# Patient Record
Sex: FEMALE | Race: WHITE | Employment: OTHER | URBAN - METROPOLITAN AREA
[De-identification: names, ages, dates, MRNs, and addresses within clinical notes are randomized per-mention and may not be internally consistent; named-entity substitution may affect disease eponyms.]

---

## 2020-08-21 ENCOUNTER — HOSPITAL ENCOUNTER (INPATIENT)
Age: 50
LOS: 4 days | Discharge: HOME | DRG: 872 | End: 2020-08-25
Attending: EMERGENCY MEDICINE | Admitting: FAMILY MEDICINE
Payer: MEDICARE

## 2020-08-21 ENCOUNTER — APPOINTMENT (OUTPATIENT)
Dept: ULTRASOUND IMAGING | Age: 50
DRG: 872 | End: 2020-08-21
Attending: INTERNAL MEDICINE
Payer: MEDICARE

## 2020-08-21 DIAGNOSIS — E86.0 DEHYDRATION: ICD-10-CM

## 2020-08-21 DIAGNOSIS — N17.9 AKI (ACUTE KIDNEY INJURY) (HCC): Primary | ICD-10-CM

## 2020-08-21 PROBLEM — N39.0 UTI (URINARY TRACT INFECTION): Status: ACTIVE | Noted: 2020-08-21

## 2020-08-21 LAB
ALBUMIN SERPL-MCNC: 3.1 G/DL (ref 3.4–5)
ALBUMIN/GLOB SERPL: 0.6 {RATIO} (ref 0.8–1.7)
ALP SERPL-CCNC: 123 U/L (ref 45–117)
ALT SERPL-CCNC: 10 U/L (ref 13–56)
ANION GAP SERPL CALC-SCNC: 7 MMOL/L (ref 3–18)
APPEARANCE UR: ABNORMAL
AST SERPL-CCNC: 8 U/L (ref 10–38)
BACTERIA URNS QL MICRO: ABNORMAL /HPF
BASOPHILS # BLD: 0 K/UL (ref 0–0.1)
BASOPHILS NFR BLD: 0 % (ref 0–2)
BILIRUB SERPL-MCNC: 0.3 MG/DL (ref 0.2–1)
BILIRUB UR QL: NEGATIVE
BUN SERPL-MCNC: 58 MG/DL (ref 7–18)
BUN/CREAT SERPL: 13 (ref 12–20)
CALCIUM SERPL-MCNC: 9.4 MG/DL (ref 8.5–10.1)
CHLORIDE SERPL-SCNC: 101 MMOL/L (ref 100–111)
CO2 SERPL-SCNC: 27 MMOL/L (ref 21–32)
COLOR UR: YELLOW
CREAT SERPL-MCNC: 4.62 MG/DL (ref 0.6–1.3)
CREAT UR-MCNC: 70 MG/DL (ref 30–125)
CREAT UR-MCNC: 71 MG/DL (ref 30–125)
DIFFERENTIAL METHOD BLD: ABNORMAL
EOSINOPHIL # BLD: 0.8 K/UL (ref 0–0.4)
EOSINOPHIL #/AREA URNS HPF: NORMAL /[HPF]
EOSINOPHIL NFR BLD: 6 % (ref 0–5)
EPITH CASTS URNS QL MICRO: ABNORMAL /LPF (ref 0–5)
ERYTHROCYTE [DISTWIDTH] IN BLOOD BY AUTOMATED COUNT: 16.8 % (ref 11.6–14.5)
GLOBULIN SER CALC-MCNC: 5 G/DL (ref 2–4)
GLUCOSE BLD STRIP.AUTO-MCNC: 98 MG/DL (ref 70–110)
GLUCOSE SERPL-MCNC: 108 MG/DL (ref 74–99)
GLUCOSE UR STRIP.AUTO-MCNC: NEGATIVE MG/DL
HCT VFR BLD AUTO: 34 % (ref 35–45)
HGB BLD-MCNC: 10.8 G/DL (ref 12–16)
HGB UR QL STRIP: ABNORMAL
KETONES UR QL STRIP.AUTO: NEGATIVE MG/DL
LACTATE SERPL-SCNC: 1 MMOL/L (ref 0.4–2)
LEUKOCYTE ESTERASE UR QL STRIP.AUTO: ABNORMAL
LYMPHOCYTES # BLD: 1.6 K/UL (ref 0.9–3.6)
LYMPHOCYTES NFR BLD: 12 % (ref 21–52)
MAGNESIUM SERPL-MCNC: 2.2 MG/DL (ref 1.6–2.6)
MCH RBC QN AUTO: 25.4 PG (ref 24–34)
MCHC RBC AUTO-ENTMCNC: 31.8 G/DL (ref 31–37)
MCV RBC AUTO: 79.8 FL (ref 74–97)
MONOCYTES # BLD: 0.7 K/UL (ref 0.05–1.2)
MONOCYTES NFR BLD: 6 % (ref 3–10)
NEUTS SEG # BLD: 10.3 K/UL (ref 1.8–8)
NEUTS SEG NFR BLD: 76 % (ref 40–73)
NITRITE UR QL STRIP.AUTO: NEGATIVE
PH UR STRIP: 6 [PH] (ref 5–8)
PLATELET # BLD AUTO: 402 K/UL (ref 135–420)
PMV BLD AUTO: 10.3 FL (ref 9.2–11.8)
POTASSIUM SERPL-SCNC: 3.9 MMOL/L (ref 3.5–5.5)
PROT SERPL-MCNC: 8.1 G/DL (ref 6.4–8.2)
PROT UR STRIP-MCNC: 30 MG/DL
PROT UR-MCNC: 63 MG/DL
PROT/CREAT UR-RTO: 0.9
RBC # BLD AUTO: 4.26 M/UL (ref 4.2–5.3)
RBC #/AREA URNS HPF: ABNORMAL /HPF (ref 0–5)
SODIUM SERPL-SCNC: 135 MMOL/L (ref 136–145)
SODIUM UR-SCNC: 42 MMOL/L (ref 20–110)
SP GR UR REFRACTOMETRY: 1.01 (ref 1–1.03)
UROBILINOGEN UR QL STRIP.AUTO: 0.2 EU/DL (ref 0.2–1)
WBC # BLD AUTO: 13.4 K/UL (ref 4.6–13.2)
WBC URNS QL MICRO: ABNORMAL /HPF (ref 0–5)

## 2020-08-21 PROCEDURE — 74011250636 HC RX REV CODE- 250/636: Performed by: EMERGENCY MEDICINE

## 2020-08-21 PROCEDURE — 74011250637 HC RX REV CODE- 250/637: Performed by: FAMILY MEDICINE

## 2020-08-21 PROCEDURE — 84156 ASSAY OF PROTEIN URINE: CPT

## 2020-08-21 PROCEDURE — 85025 COMPLETE CBC W/AUTO DIFF WBC: CPT

## 2020-08-21 PROCEDURE — 76770 US EXAM ABDO BACK WALL COMP: CPT

## 2020-08-21 PROCEDURE — 82784 ASSAY IGA/IGD/IGG/IGM EACH: CPT

## 2020-08-21 PROCEDURE — 81001 URINALYSIS AUTO W/SCOPE: CPT

## 2020-08-21 PROCEDURE — 99285 EMERGENCY DEPT VISIT HI MDM: CPT

## 2020-08-21 PROCEDURE — 84300 ASSAY OF URINE SODIUM: CPT

## 2020-08-21 PROCEDURE — 87147 CULTURE TYPE IMMUNOLOGIC: CPT

## 2020-08-21 PROCEDURE — 87086 URINE CULTURE/COLONY COUNT: CPT

## 2020-08-21 PROCEDURE — 65270000029 HC RM PRIVATE

## 2020-08-21 PROCEDURE — 80053 COMPREHEN METABOLIC PANEL: CPT

## 2020-08-21 PROCEDURE — 93005 ELECTROCARDIOGRAM TRACING: CPT

## 2020-08-21 PROCEDURE — 83883 ASSAY NEPHELOMETRY NOT SPEC: CPT

## 2020-08-21 PROCEDURE — 74011000250 HC RX REV CODE- 250: Performed by: EMERGENCY MEDICINE

## 2020-08-21 PROCEDURE — 82570 ASSAY OF URINE CREATININE: CPT

## 2020-08-21 PROCEDURE — 83605 ASSAY OF LACTIC ACID: CPT

## 2020-08-21 PROCEDURE — 86038 ANTINUCLEAR ANTIBODIES: CPT

## 2020-08-21 PROCEDURE — 87205 SMEAR GRAM STAIN: CPT

## 2020-08-21 PROCEDURE — 83735 ASSAY OF MAGNESIUM: CPT

## 2020-08-21 PROCEDURE — 82962 GLUCOSE BLOOD TEST: CPT

## 2020-08-21 PROCEDURE — 87040 BLOOD CULTURE FOR BACTERIA: CPT

## 2020-08-21 PROCEDURE — 96361 HYDRATE IV INFUSION ADD-ON: CPT

## 2020-08-21 PROCEDURE — 96374 THER/PROPH/DIAG INJ IV PUSH: CPT

## 2020-08-21 PROCEDURE — 96375 TX/PRO/DX INJ NEW DRUG ADDON: CPT

## 2020-08-21 RX ORDER — LEVOFLOXACIN 5 MG/ML
500 INJECTION, SOLUTION INTRAVENOUS
Status: DISCONTINUED | OUTPATIENT
Start: 2020-08-23 | End: 2020-08-24

## 2020-08-21 RX ORDER — PANTOPRAZOLE SODIUM 40 MG/1
40 TABLET, DELAYED RELEASE ORAL 2 TIMES DAILY
Status: DISCONTINUED | OUTPATIENT
Start: 2020-08-21 | End: 2020-08-25 | Stop reason: HOSPADM

## 2020-08-21 RX ORDER — ATORVASTATIN CALCIUM 20 MG/1
40 TABLET, FILM COATED ORAL DAILY
Status: DISCONTINUED | OUTPATIENT
Start: 2020-08-22 | End: 2020-08-25 | Stop reason: HOSPADM

## 2020-08-21 RX ORDER — METFORMIN HYDROCHLORIDE 1000 MG/1
500 TABLET ORAL DAILY
COMMUNITY
End: 2020-08-25

## 2020-08-21 RX ORDER — ACETAMINOPHEN 325 MG/1
650 TABLET ORAL
Status: DISCONTINUED | OUTPATIENT
Start: 2020-08-21 | End: 2020-08-25 | Stop reason: HOSPADM

## 2020-08-21 RX ORDER — MORPHINE SULFATE 2 MG/ML
2 INJECTION, SOLUTION INTRAMUSCULAR; INTRAVENOUS
Status: DISCONTINUED | OUTPATIENT
Start: 2020-08-21 | End: 2020-08-25 | Stop reason: HOSPADM

## 2020-08-21 RX ORDER — SODIUM CHLORIDE 0.9 % (FLUSH) 0.9 %
5-10 SYRINGE (ML) INJECTION AS NEEDED
Status: DISCONTINUED | OUTPATIENT
Start: 2020-08-21 | End: 2020-08-25 | Stop reason: HOSPADM

## 2020-08-21 RX ORDER — MELATONIN
5000 DAILY
Status: DISCONTINUED | OUTPATIENT
Start: 2020-08-22 | End: 2020-08-25 | Stop reason: HOSPADM

## 2020-08-21 RX ORDER — SODIUM CHLORIDE 0.9 % (FLUSH) 0.9 %
5-40 SYRINGE (ML) INJECTION EVERY 8 HOURS
Status: DISCONTINUED | OUTPATIENT
Start: 2020-08-21 | End: 2020-08-25 | Stop reason: HOSPADM

## 2020-08-21 RX ORDER — MELATONIN
5000 DAILY
COMMUNITY

## 2020-08-21 RX ORDER — ATORVASTATIN CALCIUM 10 MG/1
40 TABLET, FILM COATED ORAL DAILY
COMMUNITY

## 2020-08-21 RX ORDER — PANTOPRAZOLE SODIUM 20 MG/1
40 TABLET, DELAYED RELEASE ORAL 2 TIMES DAILY
COMMUNITY

## 2020-08-21 RX ORDER — ZOLPIDEM TARTRATE 5 MG/1
5 TABLET ORAL
Status: DISCONTINUED | OUTPATIENT
Start: 2020-08-21 | End: 2020-08-25 | Stop reason: HOSPADM

## 2020-08-21 RX ORDER — FAMOTIDINE 10 MG/1
40 TABLET ORAL 2 TIMES DAILY
COMMUNITY

## 2020-08-21 RX ORDER — SODIUM CHLORIDE 0.9 % (FLUSH) 0.9 %
5-40 SYRINGE (ML) INJECTION AS NEEDED
Status: DISCONTINUED | OUTPATIENT
Start: 2020-08-21 | End: 2020-08-25 | Stop reason: HOSPADM

## 2020-08-21 RX ORDER — LEVOFLOXACIN 5 MG/ML
750 INJECTION, SOLUTION INTRAVENOUS EVERY 24 HOURS
Status: DISCONTINUED | OUTPATIENT
Start: 2020-08-21 | End: 2020-08-21 | Stop reason: DRUGHIGH

## 2020-08-21 RX ORDER — BISACODYL 5 MG
5 TABLET, DELAYED RELEASE (ENTERIC COATED) ORAL DAILY PRN
Status: DISCONTINUED | OUTPATIENT
Start: 2020-08-21 | End: 2020-08-25 | Stop reason: HOSPADM

## 2020-08-21 RX ORDER — ONDANSETRON 2 MG/ML
4 INJECTION INTRAMUSCULAR; INTRAVENOUS
Status: DISCONTINUED | OUTPATIENT
Start: 2020-08-21 | End: 2020-08-25 | Stop reason: HOSPADM

## 2020-08-21 RX ORDER — OXYCODONE HYDROCHLORIDE 5 MG/1
5 TABLET ORAL
Status: DISCONTINUED | OUTPATIENT
Start: 2020-08-21 | End: 2020-08-25 | Stop reason: HOSPADM

## 2020-08-21 RX ADMIN — SODIUM CHLORIDE 1000 ML: 900 INJECTION, SOLUTION INTRAVENOUS at 11:01

## 2020-08-21 RX ADMIN — Medication 10 ML: at 14:00

## 2020-08-21 RX ADMIN — Medication 10 ML: at 22:21

## 2020-08-21 RX ADMIN — CEFEPIME HYDROCHLORIDE 2 G: 2 INJECTION, POWDER, FOR SOLUTION INTRAVENOUS at 11:01

## 2020-08-21 RX ADMIN — PANTOPRAZOLE SODIUM 40 MG: 40 TABLET, DELAYED RELEASE ORAL at 22:20

## 2020-08-21 RX ADMIN — SODIUM CHLORIDE 1000 ML: 900 INJECTION, SOLUTION INTRAVENOUS at 09:50

## 2020-08-21 RX ADMIN — LEVOFLOXACIN 750 MG: 5 INJECTION, SOLUTION INTRAVENOUS at 11:07

## 2020-08-21 NOTE — PROGRESS NOTES
Pharmacy Renal Dosing Services    Cefepime and Levofloxacin were automatically dose-adjusted per THE [facility] P&T Committee Protocol, with respect to renal function. Consult provided for this   52 y.o. , female , for the indication of UTI  Doses adjusted to:  Cefepime 1 gram IV q24h  Levofloxacin 500 mg IV q48h    Pt Weight:   Wt Readings from Last 1 Encounters:   08/21/20 77.1 kg (170 lb)     Previous Regimen   Cefepime 2 grams IV q8h    Levofloxacin 750 mg IV q24h   Serum Creatinine Lab Results   Component Value Date/Time    Creatinine 4.62 (H) 08/21/2020 09:30 AM       Creatinine Clearance Estimated Creatinine Clearance: 13.5 mL/min (A) (based on SCr of 4.62 mg/dL (H)). BUN Lab Results   Component Value Date/Time    BUN 58 (H) 08/21/2020 09:30 AM           Pharmacy to continue to monitor patient daily. Will make dosage adjustments based upon changing renal function.   Signed Kevin Engle information: [phone]

## 2020-08-21 NOTE — ED NOTES
TRANSFER - OUT REPORT:    Verbal report given to [address] RN(name) on [employer]  being transferred to [facility](unit) for routine progression of care       Report consisted of patients Situation, Background, Assessment and   Recommendations(SBAR). Information from the following report(s) SBAR, Kardex, ED Summary, [facility] and Recent Results was reviewed with the receiving nurse. Lines:   Peripheral IV 08/21/20 Right Antecubital (Active)   Site Assessment Clean, dry, & intact 08/21/20 0930   Phlebitis Assessment 0 08/21/20 0930   Infiltration Assessment 0 08/21/20 0930   Dressing Status Clean, dry, & intact; Occlusive 08/21/20 0930   Dressing Type Transparent 08/21/20 0930   Hub Color/Line Status Pink 08/21/20 0930   Action Taken Blood drawn 08/21/20 0930   Alcohol Cap Used Yes 08/21/20 0930        Opportunity for questions and clarification was provided.       Patient transported with:   [employer]

## 2020-08-21 NOTE — H&P
History & Physical    Patient: Leena Regalado MRN: [id number]  [facility]: [id number]    YOB: 1970  Age: 52 y.o. Sex: female      DOA: 8/21/2020  Primary Care Provider:  Yahaira Harris MD      Assessment/Plan   66-year-old female with a history of hypertension, hyperlipidemia, diabetes and persistent nausea vomiting admitted for urinary tract infection and acute kidney injury. Admit to Med[facility] floor    Urinary tract infection  Abnormal urinalysis  Urine culture sent, will follow  ED physician started patient on Rocephin, will continue  IV hydration    Acute kidney injury  Creatinine 4.62 with unknown baseline  Nephrologist consult placed by ED, appreciate Dr. John Rivera expertise  Will defer work-up to nephrology    Hypertension  Blood pressure well controlled  Monitor blood pressure  Will resume home medications as appropriate    Diabetes  ADA diet, SSI, fingerstick blood glucose q. before meals and nightly    DVT/GI prophylaxis ordered      Patient Active Problem List   Diagnosis Code    UTI (urinary tract infection) N39.0    ARIANNA (acute kidney injury) ([address] N17.9     Estimated length of stay : 2-3 days    CC:        HPI:     Leena Regalado is a 52 y.o. female with history of hypertension, hyperlipidemia, diabetes and persistent nausea vomiting which was thought to be due to dehydration so lab work was obtained yesterday and she was sent to the emergency room for further evaluation. The room she was found to have an slightly elevated white blood count along with a grossly abnormal urinalysis which definitely indicated infection. As a result, the ER physician called a sepsis alert. Lactic acid was normal.  However creatinine was found to be grossly abnormal at 4.62. There is no comparison creatinine available in the chart and patient does not know what her baseline creatinine is.      Past Medical History:   Diagnosis Date    Diabetes ([address]     High cholesterol        Past Surgical History: Procedure Laterality Date    HX  SECTION         History reviewed. No pertinent family history. Social History     Socioeconomic History    Marital status:      Spouse name: Not on file    Number of children: Not on file    Years of education: Not on file    Highest education level: Not on file   Tobacco Use    Smoking status: Never Smoker    Smokeless tobacco: Never Used   Substance and Sexual Activity    Alcohol use: Not Currently       Prior to Admission medications    Medication Sig Start Date End Date Taking? Authorizing Provider   metFORMIN (GLUCOPHAGE) 1,000 mg tablet Take 1,000 mg by mouth two (2) times daily (with meals). Yes Other, MD Madelyn   atorvastatin (LIPITOR) 10 mg tablet Take  by mouth daily. Yes Barrington, MD Madelyn   pantoprazole (PROTONIX) 20 mg tablet Take 20 mg by mouth daily. Yes Madelyn Ferris MD   famotidine (PEPCID) 10 mg tablet Take 10 mg by mouth two (2) times a day. Yes Barrington, MD Madelyn       No Known Allergies    Review of Systems  Gen: No fever, chills, malaise, weight loss/gain. Heent: No headache, rhinorrhea, epistaxis, ear pain, hearing loss, sinus pain, neck pain/stiffness, sore throat. Heart: No chest pain, palpitations, PUENTE, pnd, or orthopnea. Resp: No cough, hemoptysis, wheezing and shortness of breath. GI: No nausea, vomiting, diarrhea, constipation, melena or hematochezia. : No urinary obstruction, dysuria or hematuria. Derm: No rash, new skin lesion or pruritis. Musc/skeletal: no bone or joint complains. Vasc: No edema, cyanosis or claudication. Endo: No heat/cold intolerance, no polyuria,polydipsia or polyphagia. Neuro: No unilateral weakness, numbness, tingling. No seizures. Heme: No easy bruising or bleeding.           Physical Exam:     Physical Exam:  Visit Vitals  /68 (BP 1 Location: Left arm, BP Patient Position: At rest;Supine)   Pulse 78   Temp 97.9 °F (36.6 °C)   Resp 17   Ht 5' (1.524 m)   Wt 77.1 kg (170 lb) SpO2 100%   BMI 33.20 kg/m²      O2 Device: Room air    Temp (24hrs), Av.4 °F (36.3 °C), Min:96.8 °F (36 °C), Max:97.9 °F (36.6 °C)     07 -  1900  In: 1000 [I.V.:1000]  Out: -    No intake/output data recorded. General:  Awake, cooperative, no distress. Head:  Normocephalic, without obvious abnormality, atraumatic. Eyes:  Conjunctivae/corneas clear, sclera anicteric, PERRL, EOMs intact. Nose: Nares normal. No drainage or sinus tenderness. Throat: Lips, mucosa, and tongue normal.    Neck: Supple, symmetrical, trachea midline, no adenopathy. Lungs:   Clear to auscultation bilaterally. Heart:  Regular rate and rhythm, S1, S2 normal, no murmur, click, rub or gallop. Abdomen: Soft, non-tender. Bowel sounds normal. No masses,  No organomegaly. Extremities: Extremities normal, atraumatic, no cyanosis or edema. Capillary refill normal.   Pulses: 2+ and symmetric all extremities. Skin: Skin color as appropriate for ethnicity, turgor normal. No rashes or lesions   Neurologic: CNII-XII intact. No focal motor or sensory deficit. Labs Reviewed:  Recent Results (from the past 24 hour(s))   CBC WITH AUTOMATED DIFF    Collection Time: 20  9:30 AM   Result Value Ref Range    WBC 13.4 (H) 4.6 - 13.2 K/uL    RBC 4.26 4.20 - 5.30 M/uL    HGB 10.8 (L) 12.0 - 16.0 g/dL    HCT 34.0 (L) 35.0 - 45.0 %    MCV 79.8 74.0 - 97.0 FL    MCH 25.4 24.0 - 34.0 PG    MCHC 31.8 31.0 - 37.0 g/dL    RDW 16.8 (H) 11.6 - 14.5 %    PLATELET 151 831 - 740 K/uL    MPV 10.3 9.2 - 11.8 FL    NEUTROPHILS 76 (H) 40 - 73 %    LYMPHOCYTES 12 (L) 21 - 52 %    MONOCYTES 6 3 - 10 %    EOSINOPHILS 6 (H) 0 - 5 %    BASOPHILS 0 0 - 2 %    ABS. NEUTROPHILS 10.3 (H) 1.8 - 8.0 K/UL    ABS. LYMPHOCYTES 1.6 0.9 - 3.6 K/UL    ABS. MONOCYTES 0.7 0.05 - 1.2 K/UL    ABS. EOSINOPHILS 0.8 (H) 0.0 - 0.4 K/UL    ABS.  BASOPHILS 0.0 0.0 - 0.1 K/UL    DF AUTOMATED     METABOLIC PANEL, COMPREHENSIVE    Collection Time: 08/21/20  9:30 AM   Result Value Ref Range    Sodium 135 (L) 136 - 145 mmol/L    Potassium 3.9 3.5 - 5.5 mmol/L    Chloride 101 100 - 111 mmol/L    CO2 27 21 - 32 mmol/L    Anion gap 7 3.0 - 18 mmol/L    Glucose 108 (H) 74 - 99 mg/dL    BUN 58 (H) 7.0 - 18 MG/DL    Creatinine 4.62 (H) 0.6 - 1.3 MG/DL    BUN/Creatinine ratio 13 12 - 20      GFR est AA 12 (L) >60 ml/min/1.73m2    GFR est non-AA 10 (L) >60 ml/min/1.73m2    Calcium 9.4 8.5 - 10.1 MG/DL    Bilirubin, total 0.3 0.2 - 1.0 MG/DL    ALT (SGPT) 10 (L) 13 - 56 U/L    AST (SGOT) 8 (L) 10 - 38 U/L    Alk.  phosphatase 123 (H) 45 - 117 U/L    Protein, total 8.1 6.4 - 8.2 g/dL    Albumin 3.1 (L) 3.4 - 5.0 g/dL    Globulin 5.0 (H) 2.0 - 4.0 g/dL    A-G Ratio 0.6 (L) 0.8 - 1.7     MAGNESIUM    Collection Time: 08/21/20  9:30 AM   Result Value Ref Range    Magnesium 2.2 1.6 - 2.6 mg/dL   GLUCOSE, POC    Collection Time: 08/21/20  9:41 AM   Result Value Ref Range    Glucose (POC) 98 70 - 110 mg/dL   URINALYSIS W/ RFLX MICROSCOPIC    Collection Time: 08/21/20  9:42 AM   Result Value Ref Range    Color YELLOW      Appearance CLOUDY      Specific gravity 1.010 1.005 - 1.030      pH (UA) 6.0 5.0 - 8.0      Protein 30 (A) NEG mg/dL    Glucose Negative NEG mg/dL    Ketone Negative NEG mg/dL    Bilirubin Negative NEG      Blood TRACE (A) NEG      Urobilinogen 0.2 0.2 - 1.0 EU/dL    Nitrites Negative NEG      Leukocyte Esterase LARGE (A) NEG     URINE MICROSCOPIC ONLY    Collection Time: 08/21/20  9:42 AM   Result Value Ref Range    WBC 41 to 50 0 - 5 /hpf    RBC 0 to 3 0 - 5 /hpf    Epithelial cells 2+ 0 - 5 /lpf    Bacteria FEW (A) NEG /hpf   LACTIC ACID    Collection Time: 08/21/20 10:22 AM   Result Value Ref Range    Lactic acid 1.0 0.4 - 2.0 MMOL/L   EKG, 12 LEAD, INITIAL    Collection Time: 08/21/20 10:59 AM   Result Value Ref Range    Ventricular Rate 83 BPM    Atrial Rate 83 BPM    P-R Interval 214 ms    QRS Duration 80 ms    Q-T Interval 356 ms    QTC Calculation (Bezet) 418 ms    Calculated P Axis 54 degrees    Calculated R Axis 5 degrees    Calculated T Axis 7 degrees    Diagnosis       Sinus rhythm with 1st degree AV block  Otherwise normal ECG  No previous ECGs available         Procedures/imaging: see electronic medical records for all procedures/Xrays and details which were not copied into this note but were reviewed prior to creation of Plan      CC: Maria Victoria Guerrero MD

## 2020-08-21 NOTE — CONSULTS
RENAL CONSULT  2020    Patient:  Laura De Luna  :  1970  Gender:  female  MRN #:  [id number]    Consulting Physician:  Ebenezer Francisco DO,  Assessment:    ARF,  Anemia  Heavy Menses irregular   UTI  Plan:    Given anemia, weight loss and ARF need to rule out MM. Renal US  abx and IVF for now  WIll follow  With you  Urine studies. D/w Mother at the bedside. History of Present Illness:  Laura De Luna is a 52y.o. year old female with 2 years of mild diabetes has had n/v since April. She lost above 30 lbs. She has not been eating well. No NSAID  No hx of CKD as per pt. No BP meds  Has gallstone  Was treated for oral Thrash? Past Medical History:   Diagnosis Date    [address] [address]     High cholesterol      Past Surgical History:   Procedure Laterality Date    HX  SECTION       History reviewed. No pertinent family history. No Known Allergies  Current Facility-Administered Medications   Medication Dose Route Frequency Provider Last Rate Last Dose    cefepime (MAXIPIME) 2 g in sterile water (preservative free) 10 mL IV syringe  2 g IntraVENous Q8H Susannah Ramirez DO        levoFLOXacin (LEVAQUIN) 750 mg in D5W IVPB  750 mg IntraVENous Q24H Susannah Ramirez DO        sodium chloride 0.9 % bolus infusion 1,000 mL  1,000 mL IntraVENous ONCE Lavell Ramirez DO        sodium chloride (NS) flush 5-10 mL  5-10 mL IntraVENous PRN Lavell Ramirez, DO         Current Outpatient Medications   Medication Sig Dispense Refill    metFORMIN (GLUCOPHAGE) 1,000 mg tablet Take 1,000 mg by mouth two (2) times daily (with meals).  atorvastatin (LIPITOR) 10 mg tablet Take  by mouth daily.  pantoprazole (PROTONIX) 20 mg tablet Take 20 mg by mouth daily.  famotidine (PEPCID) 10 mg tablet Take 10 mg by mouth two (2) times a day. Review of Symptoms:  Below symptoms negative if not in [place].   Consitutional Symptoms: Fever, weight loss, weight gain, fatigue  Eyes:  pain, sudden vision loss, blurry vision, double vision             bleeding nose, sore throat, hoarseness  GI: nausea, vomiting, diarrhea, pain, blood in stool  Pulmonary; Cough, Shortness of breath, Wheezing's  Cardiac: chest pain, palpitation  Musculoskeletal: difficulty walking, falls, pain over muscle, joint pain, weakness  : dysuria, blood in urine, pain with urination, difficulty voiding  Neurologia: dizziness, syncope, focal weakness, difficulty speaking  Integumentary: rash, redness, ulcer   Psychiatric:  Depression suicidal ideation    Objective:  Visit Vitals  /68 (BP 1 Location: Left arm, BP Patient Position: At rest;Supine)   Pulse 78   Temp 97.9 °F (36.6 °C)   Resp 17   Ht 5' (1.524 m)   Wt 77.1 kg (170 lb)   SpO2 100%   BMI 33.20 kg/m²         Well developed and groomed  Eyes: anicteric, no subconjectival hemorrahge, eye lid without lesion  Ears, nose, mouth and throat without visible mass, scars, lesion  Neck: supple, trachea in midline position, thyriod without pain on palpation nor enlargement. Respiratory: good effort, no rales, good breath sounds, no wheezings  Cardiovascular:  Normal rate, regular rythem normal S1 S2 no rubs or gallops  GI: soft, nontender,  Nomral bowel sound  Musculoskeletal: No clubbing cynosis, no petechia  Skin: no rash, lesion or ulcers  Neruoligcal: no focal dificit grossly  Psychicatric: good judgment and insights, good memory. No suma affect. Non anxiouse.           Laboratory Data:  Lab Results   Component Value Date    BUN 58 (H) 08/21/2020     (L) 08/21/2020    CO2 27 08/21/2020     Lab Results   Component Value Date    WBC 13.4 (H) 08/21/2020    HGB 10.8 (L) 08/21/2020    HCT 34.0 (L) 08/21/2020           )Torey Cantu DO,

## 2020-08-21 NOTE — ED PROVIDER NOTES
EMERGENCY DEPARTMENT HISTORY AND PHYSICAL EXAM    Date: 2020  Patient Name: Rosalind Benjamin    History of Presenting Illness     Chief Complaint   Patient presents with    Dehydration         History Provided By: Patient and Patient's Mother    Additional History (Context): Rosalind Benjamin is a 52 y.o. female with PMHX hypertension, and non-insulin-dependent diabetes, cholelithiasis presents to the emergency department after being told by her PCP that the patient was dehydrated based on lab work that was obtained yesterday. Patient's mother and the patient reports that the patient has chronic nausea and vomiting due to cholelithiasis. Has a follow-up appointment with GI doctor in September. Had blood work drawn yesterday which indicated dehydration and was told to come to the emergency department for IV fluids. Patient reports that her nausea and vomiting has actually improved. Pt denies diarrhea, chest pain, shortness of breath, fever, and any other sxs or complaints. PCP: No primary care provider on file. Past History     Past Medical History:  Past Medical History:   Diagnosis Date    Diabetes ([address]     High cholesterol        Past Surgical History:  Past Surgical History:   Procedure Laterality Date    HX  SECTION         Family History:  History reviewed. No pertinent family history. Social History:  Social History     Tobacco Use    Smoking status: Never Smoker    Smokeless tobacco: Never Used   Substance Use Topics    Alcohol use: Not Currently    Drug use: Not on file       Allergies:  No Known Allergies      Review of Systems   Review of Systems   Constitutional: Negative for chills and fever. HENT: Negative for congestion, ear pain, sinus pain and sore throat. Eyes: Negative for pain and visual disturbance. Respiratory: Negative for cough and shortness of breath. Cardiovascular: Negative for chest pain and leg swelling.    Gastrointestinal: Positive for nausea and vomiting. Negative for abdominal pain, constipation and diarrhea. Genitourinary: Negative for dysuria, hematuria, vaginal bleeding and vaginal discharge. Musculoskeletal: Negative for back pain and neck pain. Skin: Negative for pallor and rash. Neurological: Negative for dizziness, tremors, weakness, light-headedness and headaches. All other systems reviewed and are negative. Physical Exam     Vitals:    08/21/20 0828   BP: 117/76   Pulse: (!) 104   Resp: 20   Temp: 96.8 °F (36 °C)   SpO2: 100%   Weight: 77.1 kg (170 lb)   Height: 5' (1.524 m)     Physical Exam    Nursing note and vitals reviewed    Constitutional: Middle-aged  female, no acute distress  Head: Normocephalic, Atraumatic  Eyes: Pupils are equal, round, and reactive to light, EOMI  Neck: Supple, non-tender  Cardiovascular: Regular rate and rhythm, no murmurs, rubs, or gallops, + 2 radial pulses bilaterally  Chest: Normal work of breathing and chest excursion bilaterally  Lungs: Clear to ausculation bilaterally, no wheezes, no rhonchi  Abdomen: Soft, non tender, non distended, normoactive bowel sounds  Back: No evidence of trauma or deformity  Extremities: No evidence of trauma or deformity, no LE edema. No streaking erythema, vesicular lesions, ulcerations or bulla  Skin: Warm and dry, normal cap refill  Neuro: Alert and appropriate, CN intact, normal speech, moving all 4 extremities freely and symmetrically  Psychiatric: Normal mood and affect       Diagnostic Study Results     Labs -   No results found for this or any previous visit (from the past 12 hour(s)). Radiologic Studies -   No orders to display     CT Results  (Last 48 hours)    None        CXR Results  (Last 48 hours)    None            Medical Decision Making   I am the first provider for this patient. I reviewed the vital signs, available nursing notes, past medical history, past surgical history, family history and social history.     Vital Signs-Reviewed the patient's vital signs. Pulse Oximetry Analysis -100 % on room air    Cardiac Monitor:  Rate: 104 bpm  Rhythm: Regular    11:12 AM  Sinus rhythm with first-degree AV block at 83 bpm.  WI interval 214 ms. QTc 418 ms. No acute ST elevation    Records Reviewed: Nursing Notes and Old Medical Records    Provider Notes:   52 y.o. female presenting with possible dehydration based on lab work obtained yesterday. History of chronic nausea and vomiting due to cholelithiasis. On exam patient is normotensive. Mildly tachycardic however afebrile. Patient saturating 100%, with a benign abdominal exam.  She does not appear toxic or acutely ill. Will obtain lab work to eval for any metabolic derangements. Will start IV fluids     Procedures:  Procedures    ED Course:   8:31 AM   Initial assessment performed. The patients presenting problems have been discussed, and they are in agreement with the care plan formulated and outlined with them. I have encouraged them to ask questions as they arise throughout their visit. 10:40 AM patient noted to have an ARIANNA with a creatinine 4.62.  BUN of 58. GFR of 12. Patient also noted to have a leukocytosis with a UA that is consistent with a UTI. This is likely the etiology of her ARIANNA. Patient started on antibiotics. As initially the patient was tachycardic with a leukocytosis, initiated septic work-up. Although the patient continues to be normotensive, afebrile. Benign abdominal exam.  discussed patient's history, exam, and available diagnostics results with Dr. Joe Hill. [address], nephrology, who agree with consultation during admission           Diagnosis and Disposition     10:23 AM  I have spent 120 minutes of critical care time involved in lab review, consultations with specialist, family decision-making, and documentation. During this entire length of time I was immediately available to the patient. Critical Care:   The reason for providing this level of medical care for this critically ill patient was due a critical illness that impaired one or more vital organ systems such that there was a high probability of imminent or life threatening deterioration in the patients condition. This care involved high complexity decision making to assess, manipulate, and support vital system functions, to treat this degreee vital organ system failure and to prevent further life threatening deterioration of the patients condition. Core Measures:  For Hospitalized Patients:    1. Hospitalization Decision Time:  The decision to hospitalize the patient was made by Sydnee Duverney, DO at 10:23 AM on 8/21/2020    2. Aspirin: Aspirin was not given because the patient did not present with a stroke at the time of their Emergency Department evaluation    10:22 AM  Patient is being admitted to the hospital by Dr. Uli Gross. The results of their tests and reasons for their admission have been discussed with them and/or available family. They convey agreement and understanding for the need to be admitted and for their admission diagnosis. CONDITIONS ON ADMISSION:  Sepsis is not present at the time of admission. Urinary Tract Infection is present at the time of admission. MRSA is not present at the time of admission. Wound infection is not present at the time of admission. Pressure Ulcer is not present at the time of admission. CLINICAL IMPRESSION:    1. ARIANNA (acute kidney injury) ([facility] [address]    2. Dehydration      ____________________________________     Please note that this dictation was completed with [employer], the computer voice recognition software. Quite often unanticipated grammatical, syntax, homophones, and other interpretive errors are inadvertently transcribed by the computer software. Please disregard these errors. Please excuse any errors that have escaped final proofreading.

## 2020-08-22 LAB
ANION GAP SERPL CALC-SCNC: 8 MMOL/L (ref 3–18)
BACTERIA SPEC CULT: ABNORMAL
BUN SERPL-MCNC: 46 MG/DL (ref 7–18)
BUN/CREAT SERPL: 12 (ref 12–20)
CALCIUM SERPL-MCNC: 9.4 MG/DL (ref 8.5–10.1)
CC UR VC: ABNORMAL
CHLORIDE SERPL-SCNC: 107 MMOL/L (ref 100–111)
CO2 SERPL-SCNC: 24 MMOL/L (ref 21–32)
CREAT SERPL-MCNC: 3.96 MG/DL (ref 0.6–1.3)
ERYTHROCYTE [DISTWIDTH] IN BLOOD BY AUTOMATED COUNT: 17.2 % (ref 11.6–14.5)
GLUCOSE SERPL-MCNC: 127 MG/DL (ref 74–99)
HCT VFR BLD AUTO: 34.2 % (ref 35–45)
HGB BLD-MCNC: 11.1 G/DL (ref 12–16)
MCH RBC QN AUTO: 26.3 PG (ref 24–34)
MCHC RBC AUTO-ENTMCNC: 32.5 G/DL (ref 31–37)
MCV RBC AUTO: 81 FL (ref 74–97)
PLATELET # BLD AUTO: 444 K/UL (ref 135–420)
PMV BLD AUTO: 10.4 FL (ref 9.2–11.8)
POTASSIUM SERPL-SCNC: 3.8 MMOL/L (ref 3.5–5.5)
RBC # BLD AUTO: 4.22 M/UL (ref 4.2–5.3)
SERVICE CMNT-IMP: ABNORMAL
SODIUM SERPL-SCNC: 139 MMOL/L (ref 136–145)
WBC # BLD AUTO: 13.4 K/UL (ref 4.6–13.2)

## 2020-08-22 PROCEDURE — 74011250636 HC RX REV CODE- 250/636: Performed by: FAMILY MEDICINE

## 2020-08-22 PROCEDURE — 85027 COMPLETE CBC AUTOMATED: CPT

## 2020-08-22 PROCEDURE — 74011250637 HC RX REV CODE- 250/637: Performed by: FAMILY MEDICINE

## 2020-08-22 PROCEDURE — 74011250636 HC RX REV CODE- 250/636: Performed by: EMERGENCY MEDICINE

## 2020-08-22 PROCEDURE — 80048 BASIC METABOLIC PNL TOTAL CA: CPT

## 2020-08-22 PROCEDURE — 65270000029 HC RM PRIVATE

## 2020-08-22 PROCEDURE — 74011000250 HC RX REV CODE- 250: Performed by: EMERGENCY MEDICINE

## 2020-08-22 PROCEDURE — 36415 COLL VENOUS BLD VENIPUNCTURE: CPT

## 2020-08-22 RX ORDER — SODIUM CHLORIDE 9 MG/ML
125 INJECTION, SOLUTION INTRAVENOUS CONTINUOUS
Status: DISCONTINUED | OUTPATIENT
Start: 2020-08-22 | End: 2020-08-24

## 2020-08-22 RX ADMIN — Medication 10 ML: at 14:27

## 2020-08-22 RX ADMIN — SODIUM CHLORIDE 125 ML/HR: 900 INJECTION, SOLUTION INTRAVENOUS at 11:49

## 2020-08-22 RX ADMIN — ATORVASTATIN CALCIUM 40 MG: 20 TABLET, FILM COATED ORAL at 09:29

## 2020-08-22 RX ADMIN — Medication 10 ML: at 22:21

## 2020-08-22 RX ADMIN — Medication 10 ML: at 06:56

## 2020-08-22 RX ADMIN — PANTOPRAZOLE SODIUM 40 MG: 40 TABLET, DELAYED RELEASE ORAL at 22:00

## 2020-08-22 RX ADMIN — SODIUM CHLORIDE 125 ML/HR: 900 INJECTION, SOLUTION INTRAVENOUS at 18:18

## 2020-08-22 RX ADMIN — CEFEPIME HYDROCHLORIDE 1 G: 1 INJECTION, POWDER, FOR SOLUTION INTRAMUSCULAR; INTRAVENOUS at 10:10

## 2020-08-22 RX ADMIN — PANTOPRAZOLE SODIUM 40 MG: 40 TABLET, DELAYED RELEASE ORAL at 09:29

## 2020-08-22 RX ADMIN — VITAMIN D, TAB 1000IU (100/BT) 5 TABLET: 25 TAB at 09:29

## 2020-08-22 NOTE — ROUTINE PROCESS
Bedside and Verbal shift change report given to [facility] aKren Denise RN by Nicole Engel. Report included the following information SBAR, Kardex, OR Summary, Intake/Output and MAR.

## 2020-08-22 NOTE — PROGRESS NOTES
Problem: Falls - Risk of  Goal: *Absence of Falls  Description: Document Oly Welsh Fall Risk and appropriate interventions in the flowsheet.   Outcome: Progressing Towards Goal  Note: Fall Risk Interventions:            Medication Interventions: Teach patient to arise slowly

## 2020-08-22 NOTE — PROGRESS NOTES
1300  TRANSFER - IN REPORT:    Verbal report received from Kassidy Piña RN (name) on [employer]  being received from ED (unit) for routine progression of care      Report consisted of patients Situation, Background, Assessment and   Recommendations(SBAR). Information from the following report(s) SBAR, Intake/Output, MAR and Recent Results was reviewed with the receiving nurse. Opportunity for questions and clarification was provided. Assessment completed upon patients arrival to unit and care assumed. 1600  Pt is stable. No complaints of pain. 1935  Bedside and Verbal shift change report given by Siri Padilla RN (off going nurse) to Scotty Rose RN (on coming nurse).  Report included the following information SBAR, Kardex, OR Summary, Intake/Output and MAR  Patient Vitals for the past 12 hrs:   Temp Pulse Resp BP SpO2   08/21/20 1924 98 °F (36.7 °C) 99 17 125/74 100 %   08/21/20 1541 97.9 °F (36.6 °C) 91 17 102/60 100 %   08/21/20 1300 97.8 °F (36.6 °C) 84 16 100/59 100 %   08/21/20 1110    123/66 100 %   08/21/20 1032 97.9 °F (36.6 °C) 78 17 113/68 100 %   08/21/20 1001    114/67 100 %   08/21/20 0955    117/63 100 %

## 2020-08-22 NOTE — PROGRESS NOTES
1958 - Bedside report received from [facility]. Patient in bed. Pain 0/10. Mum in . MD just saw pt, will put in orders. 2000 - Patient in bed at this time. IV to R AC  intact and patent.  + CMS. Pt A & O x 4. LS clear, on RA. Abdomen soft, NT and ND. + BS to all 4 quadrants. Denies nausea. Pain 0/10. Call light within reach. Pt had uneventful shift. Mum at bedside,, requesting to be allowed to stay ar daughter has some retardation, Cleora Romberg refuse care and meds. Mum had to coerce her to swallow a pill last night. No other issues/concerns at this time.  Call bell within reach

## 2020-08-22 NOTE — PROGRESS NOTES
RENAL CONSULT  2020    Patient:  Simi Best  :  1970  Gender:  female  MRN #:  [id number]    Consulting Physician:  Alex Villegas MD,    Assessment:      ARF,  Anemia  Heavy Menses irregular   UTI    Plan:      Creat improving  Given anemia, weight loss and ARF need to rule out MM. Renal US  abx and IVF for now  WIll follow  With you  Urine studies. D/w Mother at the bedside.      Subj    No events    Obj  Visit Vitals  /67   Pulse 100   Temp 98.2 °F (36.8 °C)   Resp 16   Ht 5' (1.524 m)   Wt 75.8 kg (167 lb 3.2 oz)   SpO2 100%   BMI 32.65 kg/m²       Labs  Recent Results (from the past 24 hour(s))   METABOLIC PANEL, BASIC    Collection Time: 20 12:58 AM   Result Value Ref Range    Sodium 139 136 - 145 mmol/L    Potassium 3.8 3.5 - 5.5 mmol/L    Chloride 107 100 - 111 mmol/L    CO2 24 21 - 32 mmol/L    Anion gap 8 3.0 - 18 mmol/L    Glucose 127 (H) 74 - 99 mg/dL    BUN 46 (H) 7.0 - 18 MG/DL    Creatinine 3.96 (H) 0.6 - 1.3 MG/DL    BUN/Creatinine ratio 12 12 - 20      GFR est AA 15 (L) >60 ml/min/1.73m2    GFR est non-AA 12 (L) >60 ml/min/1.73m2    Calcium 9.4 8.5 - 10.1 MG/DL   CBC W/O DIFF    Collection Time: 20 12:58 AM   Result Value Ref Range    WBC 13.4 (H) 4.6 - 13.2 K/uL    RBC 4.22 4.20 - 5.30 M/uL    HGB 11.1 (L) 12.0 - 16.0 g/dL    HCT 34.2 (L) 35.0 - 45.0 %    MCV 81.0 74.0 - 97.0 FL    MCH 26.3 24.0 - 34.0 PG    MCHC 32.5 31.0 - 37.0 g/dL    RDW 17.2 (H) 11.6 - 14.5 %    PLATELET 996 (H) 184 - 420 K/uL    MPV 10.4 9.2 - 11.8 FL     Current Facility-Administered Medications   Medication Dose Route Frequency Provider Last Rate Last Dose    0.9% sodium chloride infusion  125 mL/hr IntraVENous CONTINUOUS Kristy Madrigal  mL/hr at 20 1149 125 mL/hr at 20 1149    sodium chloride (NS) flush 5-10 mL  5-10 mL IntraVENous PRN Leigha Ramirez DO        sodium chloride (NS) flush 5-40 mL  5-40 mL IntraVENous Q8H Kaitlin, Thu Anguiano MD   10 mL at 08/22/20 1427    sodium chloride (NS) flush 5-40 mL  5-40 mL IntraVENous PRN Thu Madrigal MD        ondansetron (ZOFRAN) injection 4 mg  4 mg IntraVENous Q4H PRN Thu Madrigal MD        acetaminophen (TYLENOL) tablet 650 mg  650 mg Oral Q4H PRN Thu Madrigal MD        oxyCODONE IR (ROXICODONE) tablet 5 mg  5 mg Oral Q4H PRN Thu Madrigal MD        morphine injection 2 mg  2 mg IntraVENous Q3H PRN Thu Madrigal MD        bisacodyL (DULCOLAX) tablet 5 mg  5 mg Oral DAILY PRN Thu Madrigal MD        zolpidem (AMBIEN) tablet 5 mg  5 mg Oral QHS PRN Thu Madrigal MD        cefepime (MAXIPIME) 1 g in sterile water (preservative free) 10 mL IV syringe  1 g IntraVENous Q24H Jennifer Ramirez DO   1 g at 08/22/20 1010    [START ON 8/23/2020] levoFLOXacin (LEVAQUIN) 500 mg in D5W IVPB  500 mg IntraVENous Q48H Susannah Ramirez DO        atorvastatin (LIPITOR) tablet 40 mg  40 mg Oral DAILY Thu Madrigal MD   40 mg at 08/22/20 0929    cholecalciferol (VITAMIN D3) (1000 Units /25 mcg) tablet 5 Tab  5,000 Units Oral DAILY Thu Madrigal MD   5 Tab at 08/22/20 0929    pantoprazole (PROTONIX) tablet 40 mg  40 mg Oral BID Thu Madrigal MD   40 mg at 08/22/20 3923

## 2020-08-22 NOTE — PROGRESS NOTES
Problem: Falls - Risk of  Goal: *Absence of Falls  Description: Document Cora Patches Fall Risk and appropriate interventions in the flowsheet.   Outcome: Progressing Towards Goal  Note: Fall Risk Interventions:            Medication Interventions: Teach patient to arise slowly

## 2020-08-22 NOTE — PROGRESS NOTES
4079  Bedside and verbal shift change report given to Saul Ponce, RN (on coming nurse) by Lila Hodgson RN (off going nurse). Report included the following information SBAR, Kardex, OR Summary, Intake/Output and MAR. Mother is at bedside. Bedside and verbal shift change report given by MANSOOR Meza (off going nurse) to GREER Varela RN(on coming nurse). Report included the following information SBAR, Kardex, OR Summary, Intake/Output and MAR.

## 2020-08-22 NOTE — PROGRESS NOTES
Hospitalist Progress Note    Patient: Ulises Matamoros MRN: [id number]  CSN: [id number]    YOB: 1970  Age: 52 y.o. Sex: female    DOA: 8/21/2020 LOS:  LOS: 1 day          Chief Complaint:      Assessment/Plan   55-year-old female with a history of intellectual disability, hyperlipidemia, diabetes and persistent nausea vomiting admitted for urinary tract infection and acute kidney injury.      Urinary tract infection  Following urine culture  IV abx      Acute renal failure associated with anemia and UTI  Continued improvement   Appreciate Dr. Cortez  expertise     Hypertension  Blood pressure well controlled  Monitor blood pressure  Will resume home medications as appropriate     Diabetes  ADA diet, SSI, fingerstick blood glucose q. before meals and nightly     DVT/GI prophylaxis ordered       Disposition :  Patient Active Problem List   Diagnosis Code    UTI (urinary tract infection) N39.0    ARIANNA (acute kidney injury) ([facility] [address] N17.9       Subjective: Mother at bedside. Updated her and patient. Mother took lots of notes during conversation. Review of systems:    Constitutional: denies fevers, chills, myalgias  Respiratory: denies SOB, cough  Cardiovascular: denies chest pain, palpitations  Gastrointestinal: denies nausea, vomiting, diarrhea      Vital signs/Intake and Output:  Visit Vitals  /86   Pulse 100   Temp 98.6 °F (37 °C)   Resp 17   Ht 5' (1.524 m)   Wt 75.8 kg (167 lb 3.2 oz)   SpO2 100%   BMI 32.65 kg/m²     Current Shift:  No intake/output data recorded.   Last three shifts:  08/20 1901 - 08/22 0700  In: 2410 [P.O.:410; I.V.:2000]  Out: 2100 [Urine:2100]    Exam:    General: Well developed, alert, NAD, OX3  Head/Neck: NCAT, supple, No masses, No lymphadenopathy  CVS:Regular rate and rhythm, no M/R/G, S1/S2 heard, no thrill  Lungs:Clear to auscultation bilaterally, no wheezes, rhonchi, or rales  Abdomen: Soft, Nontender, No distention, Normal Bowel sounds, No hepatomegaly  Extremities: No C/C/E, pulses palpable 2+  Skin:normal texture and turgor, no rashes, no lesions  Neuro:grossly normal , follows commands  Psych:appropriate                Labs: Results:       Chemistry Recent Labs     08/22/20 0058 08/21/20 0930   * 108*    135*   K 3.8 3.9    101   CO2 24 27   BUN 46* 58*   CREA 3.96* 4.62*   CA 9.4 9.4   AGAP 8 7   BUCR 12 13   AP  --  123*   TP  --  8.1   ALB  --  3.1*   GLOB  --  5.0*   AGRAT  --  0.6*      CBC w/Diff Recent Labs     08/22/20 0058 08/21/20 0930   WBC 13.4* 13.4*   RBC 4.22 4.26   HGB 11.1* 10.8*   HCT 34.2* 34.0*   * 402   GRANS  --  76*   LYMPH  --  12*   EOS  --  6*      Cardiac Enzymes No results for input(s): CPK, CKND1, STEPHAN in the last 72 hours. No lab exists for component: CKRMB, TROIP   Coagulation No results for input(s): PTP, INR, APTT, INREXT in the last 72 hours. Lipid Panel No results found for: CHOL, CHOLPOCT, CHOLX, CHLST, CHOLV, 926981, HDL, HDLP, LDL, LDLC, DLDLP, 551638, VLDLC, VLDL, TGLX, TRIGL, TRIGP, TGLPOCT, CHHD, CHHDX   BNP No results for input(s): BNPP in the last 72 hours.    Liver Enzymes Recent Labs     08/21/20 0930   TP 8.1   ALB 3.1*   *      Thyroid Studies No results found for: T4, T3U, TSH, TSHEXT     Procedures/imaging: see electronic medical records for all procedures/Xrays and details which were not copied into this note but were reviewed prior to creation of Kaden Dunn MD

## 2020-08-23 LAB
ANION GAP SERPL CALC-SCNC: 8 MMOL/L (ref 3–18)
ATRIAL RATE: 83 BPM
BUN SERPL-MCNC: 29 MG/DL (ref 7–18)
BUN/CREAT SERPL: 9 (ref 12–20)
CALCIUM SERPL-MCNC: 9.1 MG/DL (ref 8.5–10.1)
CALCULATED P AXIS, ECG09: 54 DEGREES
CALCULATED R AXIS, ECG10: 5 DEGREES
CALCULATED T AXIS, ECG11: 7 DEGREES
CHLORIDE SERPL-SCNC: 108 MMOL/L (ref 100–111)
CO2 SERPL-SCNC: 24 MMOL/L (ref 21–32)
CREAT SERPL-MCNC: 3.08 MG/DL (ref 0.6–1.3)
DIAGNOSIS, 93000: NORMAL
ERYTHROCYTE [DISTWIDTH] IN BLOOD BY AUTOMATED COUNT: 17.3 % (ref 11.6–14.5)
GLUCOSE SERPL-MCNC: 108 MG/DL (ref 74–99)
HCT VFR BLD AUTO: 31.8 % (ref 35–45)
HGB BLD-MCNC: 9.7 G/DL (ref 12–16)
MCH RBC QN AUTO: 25.2 PG (ref 24–34)
MCHC RBC AUTO-ENTMCNC: 30.5 G/DL (ref 31–37)
MCV RBC AUTO: 82.6 FL (ref 74–97)
P-R INTERVAL, ECG05: 214 MS
PLATELET # BLD AUTO: 353 K/UL (ref 135–420)
PMV BLD AUTO: 9.9 FL (ref 9.2–11.8)
POTASSIUM SERPL-SCNC: 3.5 MMOL/L (ref 3.5–5.5)
Q-T INTERVAL, ECG07: 356 MS
QRS DURATION, ECG06: 80 MS
QTC CALCULATION (BEZET), ECG08: 418 MS
RBC # BLD AUTO: 3.85 M/UL (ref 4.2–5.3)
SODIUM SERPL-SCNC: 140 MMOL/L (ref 136–145)
VENTRICULAR RATE, ECG03: 83 BPM
WBC # BLD AUTO: 11.2 K/UL (ref 4.6–13.2)

## 2020-08-23 PROCEDURE — 80048 BASIC METABOLIC PNL TOTAL CA: CPT

## 2020-08-23 PROCEDURE — 74011250637 HC RX REV CODE- 250/637: Performed by: FAMILY MEDICINE

## 2020-08-23 PROCEDURE — 74011000250 HC RX REV CODE- 250: Performed by: EMERGENCY MEDICINE

## 2020-08-23 PROCEDURE — 74011250636 HC RX REV CODE- 250/636: Performed by: EMERGENCY MEDICINE

## 2020-08-23 PROCEDURE — 74011250636 HC RX REV CODE- 250/636: Performed by: FAMILY MEDICINE

## 2020-08-23 PROCEDURE — 85027 COMPLETE CBC AUTOMATED: CPT

## 2020-08-23 PROCEDURE — 65270000029 HC RM PRIVATE

## 2020-08-23 PROCEDURE — 36415 COLL VENOUS BLD VENIPUNCTURE: CPT

## 2020-08-23 RX ADMIN — VITAMIN D, TAB 1000IU (100/BT) 5 TABLET: 25 TAB at 09:04

## 2020-08-23 RX ADMIN — ATORVASTATIN CALCIUM 40 MG: 20 TABLET, FILM COATED ORAL at 09:06

## 2020-08-23 RX ADMIN — ONDANSETRON 4 MG: 2 INJECTION INTRAMUSCULAR; INTRAVENOUS at 18:34

## 2020-08-23 RX ADMIN — PANTOPRAZOLE SODIUM 40 MG: 40 TABLET, DELAYED RELEASE ORAL at 09:08

## 2020-08-23 RX ADMIN — LEVOFLOXACIN 500 MG: 5 INJECTION, SOLUTION INTRAVENOUS at 12:01

## 2020-08-23 RX ADMIN — PANTOPRAZOLE SODIUM 40 MG: 40 TABLET, DELAYED RELEASE ORAL at 21:04

## 2020-08-23 RX ADMIN — Medication 10 ML: at 21:04

## 2020-08-23 RX ADMIN — SODIUM CHLORIDE 125 ML/HR: 900 INJECTION, SOLUTION INTRAVENOUS at 19:52

## 2020-08-23 RX ADMIN — SODIUM CHLORIDE 125 ML/HR: 900 INJECTION, SOLUTION INTRAVENOUS at 02:33

## 2020-08-23 RX ADMIN — ONDANSETRON 4 MG: 2 INJECTION INTRAMUSCULAR; INTRAVENOUS at 09:37

## 2020-08-23 RX ADMIN — Medication 10 ML: at 05:12

## 2020-08-23 RX ADMIN — CEFEPIME HYDROCHLORIDE 1 G: 1 INJECTION, POWDER, FOR SOLUTION INTRAMUSCULAR; INTRAVENOUS at 11:53

## 2020-08-23 NOTE — PROGRESS NOTES
Problem: Falls - Risk of  Goal: *Absence of Falls  Description: Document Regi Dear Fall Risk and appropriate interventions in the flowsheet.   Outcome: Progressing Towards Goal  Note: Fall Risk Interventions:            Medication Interventions: Patient to call before getting OOB, Teach patient to arise slowly                   Problem: Patient Education: Go to Patient Education Activity  Goal: Patient/Family Education  Outcome: Progressing Towards Goal

## 2020-08-23 NOTE — PROGRESS NOTES
Hospitalist Progress Note    Patient: Paige Ordonez MRN: [id number]  CSN: [id number]    YOB: 1970  Age: 52 y.o. Sex: female    DOA: 8/21/2020 LOS:  LOS: 2 days          Chief Complaint:      Assessment/Plan   70-year-old female with a history of intellectual disability, hyperlipidemia, diabetes and persistent nausea vomiting admitted for urinary tract infection and acute kidney injury.      Urinary tract infection  STREPTOCOCCI, BETA HEMOLYTIC GROUP B on urine cuture   Penicillin and ampicillin are abx of choice    Acute renal failure associated with anemia and UTI  Much improved  Nephrology following and work up in progress     Hypertension  Blood pressure well controlled     Diabetes  ADA diet, SSI, fingerstick blood glucose q. before meals and nightly     DVT/GI prophylaxis ordered       Disposition : Home   Patient Active Problem List   Diagnosis Code    UTI (urinary tract infection) N39.0    ARIANNA (acute kidney injury) ([facility] [address] N17.9       Subjective:    Still says that her stomach is upset. Only wants to eat basic foods, did not want to advance her diet today. Mother and pt waiting to talk with nephrologist today. Review of systems:    Constitutional: denies fevers, chills, myalgias  Respiratory: denies SOB, cough  Cardiovascular: denies chest pain, palpitations  Gastrointestinal: denies nausea, vomiting, diarrhea      Vital signs/Intake and Output:  Visit Vitals  /60 (BP 1 Location: Right arm, BP Patient Position: At rest)   Pulse 83   Temp 98 °F (36.7 °C)   Resp 16   Ht 5' (1.524 m)   Wt 75.8 kg (167 lb 3.2 oz)   SpO2 100%   BMI 32.65 kg/m²     Current Shift:  No intake/output data recorded. Last three shifts:  08/21 1901 - 08/23 0700  In: 2457.1 [P.O.:620;  I.V.:1837.1]  Out: 4300 [Urine:4300]    Exam:    General: Well developed, alert, NAD, OX3  Head/Neck: NCAT, supple, No masses, No lymphadenopathy  CVS:Regular rate and rhythm, no M/R/G, S1/S2 heard, no thrill  Lungs:Clear to auscultation bilaterally, no wheezes, rhonchi, or rales  Abdomen: Soft, Nontender, No distention, Normal Bowel sounds, No hepatomegaly  Extremities: No C/C/E, pulses palpable 2+  Skin:normal texture and turgor, no rashes, no lesions  Neuro:grossly normal , follows commands  Psych:appropriate                Labs: Results:       Chemistry Recent Labs     08/23/20 0225 08/22/20 0058 08/21/20  0930   * 127* 108*    139 135*   K 3.5 3.8 3.9    107 101   CO2 24 24 27   BUN 29* 46* 58*   CREA 3.08* 3.96* 4.62*   CA 9.1 9.4 9.4   AGAP 8 8 7   BUCR 9* 12 13   AP  --   --  123*   TP  --   --  8.1   ALB  --   --  3.1*   GLOB  --   --  5.0*   AGRAT  --   --  0.6*      CBC w/Diff Recent Labs     08/23/20 0225 08/22/20 0058 08/21/20  0930   WBC 11.2 13.4* 13.4*   RBC 3.85* 4.22 4.26   HGB 9.7* 11.1* 10.8*   HCT 31.8* 34.2* 34.0*    444* 402   GRANS  --   --  76*   LYMPH  --   --  12*   EOS  --   --  6*      Cardiac Enzymes No results for input(s): CPK, CKND1, STEPHAN in the last 72 hours. No lab exists for component: CKRMB, TROIP   Coagulation No results for input(s): PTP, INR, APTT, INREXT, INREXT in the last 72 hours. Lipid Panel No results found for: CHOL, CHOLPOCT, CHOLX, CHLST, CHOLV, 648230, HDL, HDLP, LDL, LDLC, DLDLP, 185285, VLDLC, VLDL, TGLX, TRIGL, TRIGP, TGLPOCT, CHHD, CHHDX   BNP No results for input(s): BNPP in the last 72 hours.    Liver Enzymes Recent Labs     08/21/20  0930   TP 8.1   ALB 3.1*   *      Thyroid Studies No results found for: T4, T3U, TSH, TSHEXT, TSHEXT     Procedures/imaging: see electronic medical records for all procedures/Xrays and details which were not copied into this note but were reviewed prior to creation of Jimy Santoyo MD

## 2020-08-23 NOTE — PROGRESS NOTES
0730- Bedside and Verbal shift change report given to [address] [place] [address] St, RN (oncoming nurse) by Radha Romero (offgoing nurse). Report included the following information SBAR, Kardex, Intake/Output and MAR.     8127- Shift assessment completed (see Flowsheets). Hypoactive bowel sounds. No pain at this time. Bilateral 2+ edema noted. Patient is nauseated. Ambulated to bathroom. 700 mL clear yellow urine emptied from hat before urination, 700 mL clear yellow urine emptied after urination. Gown changed. Bed pads changed. 0940- Zofran given IV (see MAR). Patient eating breakfast.    1207- Meds given. Nausea improved per patient. 1530- Patient sleeping. Daily weight taken. She drank 8 oz apple juice. 1843- Zofran given. Patient reports on and off feeling of fullness in her ear. She denies ringing in her ear. She reports that she does not feel it at this time. 1900- SCD's applied per patient request.    1930- Bedside and Verbal shift change report given to GREER Romero (oncoming nurse) by [address] [place] [address] St, RN (offgoing nurse). Report included the following information SBAR, Kardex, Intake/Output and MAR.

## 2020-08-23 NOTE — PROGRESS NOTES
Bedside shift change report given to Lillian Hughes RN (oncoming nurse) by Mora Whitaker RN (offgoing nurse). Uneventful night. Denies pain. No nausea and vomiting. Visit Vitals  /59 (BP 1 Location: Left arm, BP Patient Position: At rest)   Pulse 72   Temp 97.9 °F (36.6 °C)   Resp 14   Ht 5' (1.524 m)   Wt 75.8 kg (167 lb 3.2 oz)   SpO2 100%   BMI 32.65 kg/m²       Bedside shift change report given to Violetta Mahoney RN (oncoming nurse) by [employer] RN (offgoing nurse). Report included the following information SBAR, Kardex, ED Summary, Intake/Output, MAR, Recent Results, Alarm Parameters  and Quality Measures.

## 2020-08-24 ENCOUNTER — APPOINTMENT (OUTPATIENT)
Dept: GENERAL RADIOLOGY | Age: 50
DRG: 872 | End: 2020-08-24
Attending: HOSPITALIST
Payer: MEDICARE

## 2020-08-24 PROBLEM — F81.9 INTELLECTUAL DELAY: Status: ACTIVE | Noted: 2020-08-24

## 2020-08-24 PROBLEM — R11.2 NAUSEA AND VOMITING: Status: ACTIVE | Noted: 2020-08-24

## 2020-08-24 PROBLEM — N92.0 MENORRHAGIA: Status: ACTIVE | Noted: 2020-08-24

## 2020-08-24 LAB
ANION GAP SERPL CALC-SCNC: 7 MMOL/L (ref 3–18)
BUN SERPL-MCNC: 19 MG/DL (ref 7–18)
BUN/CREAT SERPL: 8 (ref 12–20)
CALCIUM SERPL-MCNC: 8.8 MG/DL (ref 8.5–10.1)
CHLORIDE SERPL-SCNC: 112 MMOL/L (ref 100–111)
CO2 SERPL-SCNC: 24 MMOL/L (ref 21–32)
COLLECT DURATION TIME UR: ABNORMAL HR
CREAT SERPL-MCNC: 2.38 MG/DL (ref 0.6–1.3)
ERYTHROCYTE [DISTWIDTH] IN BLOOD BY AUTOMATED COUNT: 17.4 % (ref 11.6–14.5)
EST. AVERAGE GLUCOSE BLD GHB EST-MCNC: 134 MG/DL
GLUCOSE BLD STRIP.AUTO-MCNC: 98 MG/DL (ref 70–110)
GLUCOSE SERPL-MCNC: 101 MG/DL (ref 74–99)
HBA1C MFR BLD: 6.3 % (ref 4.2–5.6)
HCT VFR BLD AUTO: 30.5 % (ref 35–45)
HGB BLD-MCNC: 9.6 G/DL (ref 12–16)
KAPPA LC UR-MCNC: 414.82 MG/L (ref 0.63–113.79)
KAPPA LC/LAMBDA UR: 5.73 {RATIO} (ref 1.03–31.76)
LAMBDA LC UR-MCNC: 72.43 MG/L (ref 0.47–11.77)
MCH RBC QN AUTO: 25.7 PG (ref 24–34)
MCHC RBC AUTO-ENTMCNC: 31.5 G/DL (ref 31–37)
MCV RBC AUTO: 81.6 FL (ref 74–97)
PLATELET # BLD AUTO: 320 K/UL (ref 135–420)
PMV BLD AUTO: 10.2 FL (ref 9.2–11.8)
POTASSIUM SERPL-SCNC: 3.7 MMOL/L (ref 3.5–5.5)
RBC # BLD AUTO: 3.74 M/UL (ref 4.2–5.3)
SODIUM SERPL-SCNC: 143 MMOL/L (ref 136–145)
SPECIMEN VOL ?TM UR: ABNORMAL ML
WBC # BLD AUTO: 9.7 K/UL (ref 4.6–13.2)

## 2020-08-24 PROCEDURE — 82962 GLUCOSE BLOOD TEST: CPT

## 2020-08-24 PROCEDURE — 74011250637 HC RX REV CODE- 250/637: Performed by: FAMILY MEDICINE

## 2020-08-24 PROCEDURE — 74011250636 HC RX REV CODE- 250/636: Performed by: FAMILY MEDICINE

## 2020-08-24 PROCEDURE — 74011000250 HC RX REV CODE- 250: Performed by: EMERGENCY MEDICINE

## 2020-08-24 PROCEDURE — 74011250636 HC RX REV CODE- 250/636: Performed by: EMERGENCY MEDICINE

## 2020-08-24 PROCEDURE — 36415 COLL VENOUS BLD VENIPUNCTURE: CPT

## 2020-08-24 PROCEDURE — 83036 HEMOGLOBIN GLYCOSYLATED A1C: CPT

## 2020-08-24 PROCEDURE — 84155 ASSAY OF PROTEIN SERUM: CPT

## 2020-08-24 PROCEDURE — 74011250637 HC RX REV CODE- 250/637: Performed by: HOSPITALIST

## 2020-08-24 PROCEDURE — 85027 COMPLETE CBC AUTOMATED: CPT

## 2020-08-24 PROCEDURE — 72100 X-RAY EXAM L-S SPINE 2/3 VWS: CPT

## 2020-08-24 PROCEDURE — 65270000029 HC RM PRIVATE

## 2020-08-24 PROCEDURE — 80048 BASIC METABOLIC PNL TOTAL CA: CPT

## 2020-08-24 RX ORDER — LEVOFLOXACIN 500 MG/1
500 TABLET, FILM COATED ORAL
Status: DISCONTINUED | OUTPATIENT
Start: 2020-08-25 | End: 2020-08-24

## 2020-08-24 RX ORDER — POLYETHYLENE GLYCOL 3350 17 G/17G
17 POWDER, FOR SOLUTION ORAL DAILY
Status: DISCONTINUED | OUTPATIENT
Start: 2020-08-24 | End: 2020-08-25 | Stop reason: HOSPADM

## 2020-08-24 RX ORDER — INSULIN LISPRO 100 [IU]/ML
INJECTION, SOLUTION INTRAVENOUS; SUBCUTANEOUS
Status: DISCONTINUED | OUTPATIENT
Start: 2020-08-24 | End: 2020-08-25 | Stop reason: HOSPADM

## 2020-08-24 RX ORDER — MAGNESIUM SULFATE 100 %
4 CRYSTALS MISCELLANEOUS AS NEEDED
Status: DISCONTINUED | OUTPATIENT
Start: 2020-08-24 | End: 2020-08-25 | Stop reason: HOSPADM

## 2020-08-24 RX ORDER — AMOXICILLIN 250 MG/1
500 CAPSULE ORAL EVERY 8 HOURS
Status: DISCONTINUED | OUTPATIENT
Start: 2020-08-24 | End: 2020-08-25 | Stop reason: DRUGHIGH

## 2020-08-24 RX ORDER — AMOXICILLIN 250 MG
1 CAPSULE ORAL DAILY
Status: DISCONTINUED | OUTPATIENT
Start: 2020-08-24 | End: 2020-08-25 | Stop reason: HOSPADM

## 2020-08-24 RX ADMIN — AMOXICILLIN 500 MG: 250 CAPSULE ORAL at 21:01

## 2020-08-24 RX ADMIN — Medication 10 ML: at 13:58

## 2020-08-24 RX ADMIN — Medication 10 ML: at 05:21

## 2020-08-24 RX ADMIN — SODIUM CHLORIDE 125 ML/HR: 900 INJECTION, SOLUTION INTRAVENOUS at 04:31

## 2020-08-24 RX ADMIN — SENNOSIDES AND DOCUSATE SODIUM 1 TABLET: 8.6; 5 TABLET ORAL at 11:49

## 2020-08-24 RX ADMIN — PANTOPRAZOLE SODIUM 40 MG: 40 TABLET, DELAYED RELEASE ORAL at 20:56

## 2020-08-24 RX ADMIN — POLYETHYLENE GLYCOL 3350 17 G: 17 POWDER, FOR SOLUTION ORAL at 11:49

## 2020-08-24 RX ADMIN — AMOXICILLIN 500 MG: 250 CAPSULE ORAL at 14:02

## 2020-08-24 RX ADMIN — OXYCODONE 5 MG: 5 TABLET ORAL at 15:35

## 2020-08-24 RX ADMIN — VITAMIN D, TAB 1000IU (100/BT) 5 TABLET: 25 TAB at 08:08

## 2020-08-24 RX ADMIN — PANTOPRAZOLE SODIUM 40 MG: 40 TABLET, DELAYED RELEASE ORAL at 08:08

## 2020-08-24 RX ADMIN — ATORVASTATIN CALCIUM 40 MG: 20 TABLET, FILM COATED ORAL at 08:08

## 2020-08-24 RX ADMIN — ONDANSETRON 4 MG: 2 INJECTION INTRAMUSCULAR; INTRAVENOUS at 15:35

## 2020-08-24 RX ADMIN — Medication 10 ML: at 22:00

## 2020-08-24 RX ADMIN — CEFEPIME HYDROCHLORIDE 1 G: 1 INJECTION, POWDER, FOR SOLUTION INTRAMUSCULAR; INTRAVENOUS at 10:25

## 2020-08-24 RX ADMIN — ONDANSETRON 4 MG: 2 INJECTION INTRAMUSCULAR; INTRAVENOUS at 08:08

## 2020-08-24 NOTE — PROGRESS NOTES
6071  Bedside and verbal shift change report given to Leonides Parsons RN (on coming nurse) by Radha palma. Rik Montilla, RN (off going nurse). Report included the following information SBAR, Kardex, OR Summary, Intake/Output and MAR.    1535  Pt still vomits. Administered Zofran 4 mg. Pt's mom wants to know why pt has back pain and the exact cause of pt's chronic nausea. Administer the most PO meds w/ apple sauce and pt could swallow. 200  Pt's mother wants communion w/ . Paged  on call. [address] will do communion w/ pt tomorrow. 1938  Bedside and verbal shift change report given by MANSOOR Meza (off going nurse) to Nela Treadwell RN(on coming nurse). Report included the following information SBAR, Kardex, OR Summary, Intake/Output and MAR.

## 2020-08-24 NOTE — PROGRESS NOTES
Assessment:     ARF,  Anemia  Heavy Menses irregular   UTI strep UTI  Plan:    Given anemia, weight loss and ARF need to rule out MM. Labs pending  PT is taking oral now, will stop IVF  Beta Strep UTI could be descending, NO other source of infection other than toothache that the pt had one week ago   From renal stand point, she can be discharged with follow up with me at office. D/w with mother at bedside. Time spent 31 minutes    CC: ARF,   Interval History: no interval change since yesterday      Subjective:   PT does not have any somatic complaints  Pt is eating better    Review of Systems  Negative for  SOB,  Nausea, vomiting        Blood pressure 121/60, pulse 78, temperature 98 °F (36.7 °C), resp. rate 17, height 5' (1.524 m), weight 81.6 kg (179 lb 14.3 oz), SpO2 100 %.         Intake/Output Summary (Last 24 hours) at 8/24/2020 0995  Last data filed at 8/24/2020 6223  Gross per 24 hour   Intake 1299.16 ml   Output 2850 ml   Net -1550.84 ml      Recent Labs     08/24/20  0500   WBC 9.7     Lab Results   Component Value Date/Time    Sodium 143 08/24/2020 05:00 AM    Potassium 3.7 08/24/2020 05:00 AM    Chloride 112 (H) 08/24/2020 05:00 AM    CO2 24 08/24/2020 05:00 AM    Anion gap 7 08/24/2020 05:00 AM    Glucose 101 (H) 08/24/2020 05:00 AM    BUN 19 (H) 08/24/2020 05:00 AM    Creatinine 2.38 (H) 08/24/2020 05:00 AM    BUN/Creatinine ratio 8 (L) 08/24/2020 05:00 AM    GFR est AA 26 (L) 08/24/2020 05:00 AM    GFR est non-AA 22 (L) 08/24/2020 05:00 AM    Calcium 8.8 08/24/2020 05:00 AM        Current Facility-Administered Medications   Medication Dose Route Frequency Provider Last Rate Last Dose    insulin lispro (HUMALOG) injection   SubCUTAneous AC&HS Annette Nye MD        glucose chewable tablet 16 g  4 Tab Oral PRN Annette Nye MD        glucagon [facility]) injection 1 mg  1 mg IntraMUSCular PRN Annette Nye MD        sodium chloride (NS) flush 5-10 mL  5-10 mL IntraVENous PRN James, Jennifer Jones, DO        sodium chloride (NS) flush 5-40 mL  5-40 mL IntraVENous Q8H Thu Madrigal MD   10 mL at 08/24/20 0521    sodium chloride (NS) flush 5-40 mL  5-40 mL IntraVENous PRN Thu Madrigal MD        ondansetron (ZOFRAN) injection 4 mg  4 mg IntraVENous Q4H PRN Thu Madrigal MD   4 mg at 08/24/20 0808    acetaminophen (TYLENOL) tablet 650 mg  650 mg Oral Q4H PRN Thu Madrigal MD        oxyCODONE IR (ROXICODONE) tablet 5 mg  5 mg Oral Q4H PRN Thu Madrigal MD        morphine injection 2 mg  2 mg IntraVENous Q3H PRN Jesus-Thu Salvador MD        bisacodyL (DULCOLAX) tablet 5 mg  5 mg Oral DAILY PRN Thu Madrigal MD        zolpidem (AMBIEN) tablet 5 mg  5 mg Oral QHS PRN Thu Madrigal MD        cefepime (MAXIPIME) 1 g in sterile water (preservative free) 10 mL IV syringe  1 g IntraVENous Q24H Jennifer Ramirez, DO   1 g at 08/23/20 1153    levoFLOXacin (LEVAQUIN) 500 mg in D5W IVPB  500 mg IntraVENous Q48H Jennifer Ramirez  mL/hr at 08/23/20 1201 500 mg at 08/23/20 1201    atorvastatin (LIPITOR) tablet 40 mg  40 mg Oral DAILY Thu Madrigal MD   40 mg at 08/24/20 6931    cholecalciferol (VITAMIN D3) (1000 Units /25 mcg) tablet 5 Tab  5,000 Units Oral DAILY Thu Madrigal MD   5 Tab at 08/24/20 0808    pantoprazole (PROTONIX) tablet 40 mg  40 mg Oral BID Thu Madrigal MD   40 mg at 08/24/20 6994

## 2020-08-24 NOTE — PROGRESS NOTES
Hospitalist Progress Note    Patient: Ynes Magaña MRN: [id number]  CSN: [id number]    YOB: 1970  Age: 52 y.o. Sex: female    DOA: 8/21/2020 LOS:  LOS: 3 days          Chief Complaint:    ARF      Assessment/Plan     72-year-old female with a history of intellectual disability, hyperlipidemia, diabetes and persistent nausea vomiting admitted for urinary tract infection and acute kidney injury.      Urinary tract infection  Strep infection  Preference per micro is penicillin agaent  Start amox PO, see if she can tolerate oral abx  Stop levaquin and cefepime    Cholelithiasis-dx as outpatient-no RUq tenderness-has surgery consult 9/3     Acute renal failure associated with anemia and UTIimproved, but nausea remains    Nausea-start bowel regimen, no BM in 4-5 days.  zofran as needed    Hypertension  Blood pressure well controlled     Diabetes  ADA diet, SSI, fingerstick blood glucose q. before meals and nightly ordered, BG 98 this check today, get A1C result    Anemia-stable    Discussed with mother at bedside    Hopefully she can feel stable enough for d/c tomorrow    I did send SPEP and did lumbar xrays as recommended to check for myelomatous changes-her xray is neg for that    Repeat BMP in am     Disposition :  Patient Active Problem List   Diagnosis Code    UTI (urinary tract infection) N39.0    ARIANNA (acute kidney injury) ([facility] [address] N17.9    Intellectual delay F81.9    Nausea and vomiting R11.2    Menorrhagia N92.0       Subjective:    Denies new issue except persistent nausea  Has tolerated small bites this am and fluids PO  Had xray done  No abd pain    Dx with gallstones per mom and sees surgeon in consult next week    Review of systems:    Constitutional: denies fevers, chills, myalgias  Respiratory: denies SOB  Cardiovascular: denies chest pain  Gastrointestinal: denies  vomiting, diarrhea      Vital signs/Intake and Output:  Visit Vitals  /60 (BP 1 Location: Right arm, BP Patient Position: At rest)   Pulse 78   Temp 98 °F (36.7 °C)   Resp 17   Ht 5' (1.524 m)   Wt 81.6 kg (179 lb 14.3 oz)   SpO2 100%   BMI 35.13 kg/m²     Current Shift:  No intake/output data recorded. Last three shifts:  08/22 1901 - 08/24 0700  In: 2591.2 [P.O.:360; I.V.:2231.2]  Out: 5850 [Urine:5850]    Exam:    General: Well developed, alert, NAD, OX3  CVS:Regular rate and rhythm, no M/R/G, S1/S2 heard, no thrill  Lungs:Clear to auscultation bilaterally, no wheezes, rhonchi, or rales  Abdomen: Soft, Nontender, No distention, Normal Bowel sounds, No hepatomegaly  Extremities: No C/C/E, pulses palpable 2+  Neuro:grossly normal , follows commands  Psych:appropriate                Labs: Results:       Chemistry Recent Labs     08/24/20  0500 08/23/20 0225 08/22/20 0058 08/21/20  0930   * 108* 127* 108*    140 139 135*   K 3.7 3.5 3.8 3.9   * 108 107 101   CO2 24 24 24 27   BUN 19* 29* 46* 58*   CREA 2.38* 3.08* 3.96* 4.62*   CA 8.8 9.1 9.4 9.4   AGAP 7 8 8 7   BUCR 8* 9* 12 13   AP  --   --   --  123*   TP  --   --   --  8.1   ALB  --   --   --  3.1*   GLOB  --   --   --  5.0*   AGRAT  --   --   --  0.6*      CBC w/Diff Recent Labs     08/24/20  0500 08/23/20 0225 08/22/20 0058 08/21/20  0930   WBC 9.7 11.2 13.4* 13.4*   RBC 3.74* 3.85* 4.22 4.26   HGB 9.6* 9.7* 11.1* 10.8*   HCT 30.5* 31.8* 34.2* 34.0*    353 444* 402   GRANS  --   --   --  76*   LYMPH  --   --   --  12*   EOS  --   --   --  6*      Cardiac Enzymes No results for input(s): CPK, CKND1, STEPHAN in the last 72 hours. No lab exists for component: CKRMB, TROIP   Coagulation No results for input(s): PTP, INR, APTT, INREXT, INREXT in the last 72 hours. Lipid Panel No results found for: CHOL, CHOLPOCT, CHOLX, CHLST, CHOLV, 651111, HDL, HDLP, LDL, LDLC, DLDLP, 535075, VLDLC, VLDL, TGLX, TRIGL, TRIGP, TGLPOCT, CHHD, CHHDX   BNP No results for input(s): BNPP in the last 72 hours.    Liver Enzymes Recent Labs     08/21/20  0930 TP 8.1   ALB 3.1*   *      Thyroid Studies No results found for: T4, T3U, TSH, TSHEXT, TSHEXT     Procedures/imaging: see electronic medical records for all procedures/Xrays and details which were not copied into this note but were reviewed prior to creation of Perla Kebede MD

## 2020-08-24 NOTE — PROGRESS NOTES
Occupational Therapy Screening:  Services are not indicated at this time. [facility] for [facility] therapy based on results obtained during the nursing admission assessment. The patients chart was reviewed and the patient is not appropriate for a skilled therapy evaluation at this time. Patient was admitted with UTI. Pt passed Egress and has been ambulating to bathroom. Pt has baseline MRDD w/ assist from Mother at bedside. No skilled OT indicated at this time. Thank you.   Dory Lott, OTR/L, CSRS, CDCS, [email or web address]

## 2020-08-24 NOTE — PROGRESS NOTES
Problem: Falls - Risk of  Goal: *Absence of Falls  Description: Document Ryan Miquel Fall Risk and appropriate interventions in the flowsheet.   Outcome: Progressing Towards Goal  Note: Fall Risk Interventions:            Medication Interventions: Teach patient to arise slowly

## 2020-08-24 NOTE — PROGRESS NOTES
Reason for Admission:   UTI                   RUR Score:   12%                  Plan for utilizing home health:     Unlikely     PCP: First and Last name:     Name of Practice:    Are you a current patient: Yes/No:    Approximate date of last visit:    Can you participate in a virtual visit with your PCP:                     Current Advanced Directive/Advance Care Plan:  Not on file                         Transition of Care Plan:  Home with family support and physician follow up                     Chart reviewed. Per H&P Lupillo Macario is a 52 y.o. female with history of hypertension, hyperlipidemia, diabetes and persistent nausea vomiting which was thought to be due to dehydration so lab work was obtained yesterday and she was sent to the emergency room for further evaluation. The room she was found to have an slightly elevated white blood count along with a grossly abnormal urinalysis which definitely indicated infection. As a result, the ER physician called a sepsis alert. Lactic acid was normal.  However creatinine was found to be grossly abnormal at 4.62. There is no comparison creatinine available in the chart and patient does not know what her baseline creatinine is. \"    Please encourage ambulation as appropriate to assist with identifying an appropriate transition of care. Anticipate pt will transition home with family assistance and physician follow up. CM to continue to follow and assist.    2704:  CM met with pt and her mother, Clementine Morse ([phone]), at bedside to discuss transition of care. Pt is intellectually disabled. Pt lives with her parents and is independent. Pt/family have confirmed pt's PCP. Provider has indicated pt will likely transition home with in the next 24-48 hours with family support and physician follow up. No other transition of care needs have been identified. CM remains available.                Care Management Interventions  Mode of Transport at Discharge: Other (see comment)(Family)  Transition of Care Consult (CM Consult): Discharge Planning  Health Maintenance Reviewed: Yes  Current Support Network: Family Lives Nearby  Confirm Follow Up Transport: Self  The Plan for Transition of Care is Related to the Following Treatment Goals : Home with physician follow up   Discharge Location  Discharge Placement: Home with family assistance

## 2020-08-24 NOTE — PROGRESS NOTES
Bedside shift change report given to Lillian Hughes RN (oncoming nurse) by Yoli Catherine RN (offgoing nurse). Uneventful night. Denies pain. No nausea and vomiting. Visit Vitals  /75 (BP 1 Location: Right arm, BP Patient Position: At rest)   Pulse 64   Temp 98.3 °F (36.8 °C)   Resp 16   Ht 5' (1.524 m)   Wt 81.6 kg (179 lb 14.3 oz)   SpO2 100%   BMI 35.13 kg/m²       Bedside shift change report given to Cierra Mack RN (oncoming nurse) by [employer] RN (offgoing nurse). Report included the following information SBAR, Kardex, ED Summary, Intake/Output, Recent Results, Alarm Parameters  and Quality Measures.

## 2020-08-25 VITALS
TEMPERATURE: 97.5 F | WEIGHT: 179.9 LBS | BODY MASS INDEX: 35.32 KG/M2 | DIASTOLIC BLOOD PRESSURE: 87 MMHG | SYSTOLIC BLOOD PRESSURE: 128 MMHG | RESPIRATION RATE: 17 BRPM | HEIGHT: 60 IN | HEART RATE: 107 BPM | OXYGEN SATURATION: 100 %

## 2020-08-25 LAB
ANION GAP SERPL CALC-SCNC: 7 MMOL/L (ref 3–18)
BASOPHILS # BLD: 0.1 K/UL (ref 0–0.1)
BASOPHILS NFR BLD: 1 % (ref 0–2)
BUN SERPL-MCNC: 14 MG/DL (ref 7–18)
BUN/CREAT SERPL: 6 (ref 12–20)
CALCIUM SERPL-MCNC: 9 MG/DL (ref 8.5–10.1)
CHLORIDE SERPL-SCNC: 109 MMOL/L (ref 100–111)
CO2 SERPL-SCNC: 25 MMOL/L (ref 21–32)
CREAT SERPL-MCNC: 2.21 MG/DL (ref 0.6–1.3)
DIFFERENTIAL METHOD BLD: ABNORMAL
EOSINOPHIL # BLD: 0.9 K/UL (ref 0–0.4)
EOSINOPHIL NFR BLD: 8 % (ref 0–5)
ERYTHROCYTE [DISTWIDTH] IN BLOOD BY AUTOMATED COUNT: 17.2 % (ref 11.6–14.5)
GLUCOSE BLD STRIP.AUTO-MCNC: 126 MG/DL (ref 70–110)
GLUCOSE BLD STRIP.AUTO-MCNC: 147 MG/DL (ref 70–110)
GLUCOSE SERPL-MCNC: 112 MG/DL (ref 74–99)
HCT VFR BLD AUTO: 31.6 % (ref 35–45)
HGB BLD-MCNC: 10 G/DL (ref 12–16)
LYMPHOCYTES # BLD: 1.8 K/UL (ref 0.9–3.6)
LYMPHOCYTES NFR BLD: 16 % (ref 21–52)
MCH RBC QN AUTO: 25.9 PG (ref 24–34)
MCHC RBC AUTO-ENTMCNC: 31.6 G/DL (ref 31–37)
MCV RBC AUTO: 81.9 FL (ref 74–97)
MONOCYTES # BLD: 0.7 K/UL (ref 0.05–1.2)
MONOCYTES NFR BLD: 6 % (ref 3–10)
NEUTS SEG # BLD: 7.6 K/UL (ref 1.8–8)
NEUTS SEG NFR BLD: 69 % (ref 40–73)
PLATELET # BLD AUTO: 336 K/UL (ref 135–420)
PMV BLD AUTO: 10.7 FL (ref 9.2–11.8)
POTASSIUM SERPL-SCNC: 3.2 MMOL/L (ref 3.5–5.5)
RBC # BLD AUTO: 3.86 M/UL (ref 4.2–5.3)
SODIUM SERPL-SCNC: 141 MMOL/L (ref 136–145)
WBC # BLD AUTO: 11 K/UL (ref 4.6–13.2)

## 2020-08-25 PROCEDURE — 82962 GLUCOSE BLOOD TEST: CPT

## 2020-08-25 PROCEDURE — 85025 COMPLETE CBC W/AUTO DIFF WBC: CPT

## 2020-08-25 PROCEDURE — 74011250637 HC RX REV CODE- 250/637: Performed by: HOSPITALIST

## 2020-08-25 PROCEDURE — 83883 ASSAY NEPHELOMETRY NOT SPEC: CPT

## 2020-08-25 PROCEDURE — 80048 BASIC METABOLIC PNL TOTAL CA: CPT

## 2020-08-25 PROCEDURE — 74011250636 HC RX REV CODE- 250/636: Performed by: FAMILY MEDICINE

## 2020-08-25 PROCEDURE — 74011250637 HC RX REV CODE- 250/637: Performed by: FAMILY MEDICINE

## 2020-08-25 PROCEDURE — 36415 COLL VENOUS BLD VENIPUNCTURE: CPT

## 2020-08-25 RX ORDER — GLIPIZIDE 5 MG/1
2.5 TABLET ORAL 2 TIMES DAILY
Qty: 15 TAB | Refills: 0 | Status: SHIPPED | OUTPATIENT
Start: 2020-08-25

## 2020-08-25 RX ORDER — POTASSIUM CHLORIDE 20 MEQ/1
20 TABLET, EXTENDED RELEASE ORAL
Status: COMPLETED | OUTPATIENT
Start: 2020-08-25 | End: 2020-08-25

## 2020-08-25 RX ORDER — INSULIN PUMP SYRINGE, 3 ML
EACH MISCELLANEOUS
Qty: 1 KIT | Refills: 0 | Status: SHIPPED | OUTPATIENT
Start: 2020-08-25

## 2020-08-25 RX ORDER — AMOXICILLIN 250 MG/1
500 CAPSULE ORAL EVERY 12 HOURS
Status: DISCONTINUED | OUTPATIENT
Start: 2020-08-25 | End: 2020-08-25 | Stop reason: HOSPADM

## 2020-08-25 RX ORDER — AMOXICILLIN 500 MG/1
500 CAPSULE ORAL EVERY 12 HOURS
Qty: 10 CAP | Refills: 0 | Status: SHIPPED | OUTPATIENT
Start: 2020-08-25 | End: 2020-08-30

## 2020-08-25 RX ORDER — ADHESIVE BANDAGE
30 BANDAGE TOPICAL DAILY PRN
Status: DISCONTINUED | OUTPATIENT
Start: 2020-08-25 | End: 2020-08-25 | Stop reason: HOSPADM

## 2020-08-25 RX ADMIN — POTASSIUM CHLORIDE 20 MEQ: 20 TABLET, EXTENDED RELEASE ORAL at 16:00

## 2020-08-25 RX ADMIN — Medication 5 ML: at 14:00

## 2020-08-25 RX ADMIN — PANTOPRAZOLE SODIUM 40 MG: 40 TABLET, DELAYED RELEASE ORAL at 11:12

## 2020-08-25 RX ADMIN — POLYETHYLENE GLYCOL 3350 17 G: 17 POWDER, FOR SOLUTION ORAL at 11:12

## 2020-08-25 RX ADMIN — VITAMIN D, TAB 1000IU (100/BT) 5 TABLET: 25 TAB at 11:11

## 2020-08-25 RX ADMIN — AMOXICILLIN 500 MG: 250 CAPSULE ORAL at 06:10

## 2020-08-25 RX ADMIN — Medication 10 ML: at 06:12

## 2020-08-25 RX ADMIN — ATORVASTATIN CALCIUM 40 MG: 20 TABLET, FILM COATED ORAL at 11:11

## 2020-08-25 RX ADMIN — SENNOSIDES AND DOCUSATE SODIUM 1 TABLET: 8.6; 5 TABLET ORAL at 11:12

## 2020-08-25 RX ADMIN — ONDANSETRON 4 MG: 2 INJECTION INTRAMUSCULAR; INTRAVENOUS at 06:47

## 2020-08-25 RX ADMIN — ONDANSETRON 4 MG: 2 INJECTION INTRAMUSCULAR; INTRAVENOUS at 02:51

## 2020-08-25 NOTE — ROUTINE PROCESS
Bedside and Verbal shift change report given to Daniele Siu (oncoming nurse) by JESSICA Acosta RN (offgoing nurse). Report included the following information SBAR, Kardex, Intake/Output and MAR.

## 2020-08-25 NOTE — PROGRESS NOTES
Pharmacy Renal Dosing Services    Amoxicillin was automatically dose-adjusted per THE [facility] P&T Committee Protocol, with respect to renal function. Consult provided for this   52 y.o. , female , for the indication of UTI  Dose adjusted to:  Amoxicillin 500 mg PO q12h  (4 days remaining)    Pt Weight:   Wt Readings from Last 1 Encounters:   08/23/20 81.6 kg (179 lb 14.3 oz)       Previous Regimen   Amoxicillin 500 mg PO q8h (x 5 days)   Serum Creatinine Lab Results   Component Value Date/Time    Creatinine 2.21 (H) 08/25/2020 04:10 AM       Creatinine Clearance Estimated Creatinine Clearance: 29.1 mL/min (A) (based on SCr of 2.21 mg/dL (H)). BUN Lab Results   Component Value Date/Time    BUN 14 08/25/2020 04:10 AM         Pharmacy to continue to monitor patient daily. Will make dosage adjustments based upon changing renal function.   Signed Kevin Singh information: [phone]

## 2020-08-25 NOTE — PROGRESS NOTES
Assessment:     ARF,  Anemia  Heavy Menses irregular   UTI strep UTI  Plan:    Given anemia, weight loss and ARF need to rule out MM. Labs pending  Beta Strep UTI could be descending, NO other source of infection other than toothache that the pt had one week ago   From renal stand point, she can be discharged with follow up with me at office. D/w with mother at bedside. Time spent 31 minutes     CC: ARF,   Interval History: no interval change since yesterday        Subjective:   PT does not have any somatic complaints  Pt is eating better, still gagging on food and pills       Review of Systems  Negative for  SOB,  Nausea, vomiting       Blood pressure 114/73, pulse 82, temperature 98.1 °F (36.7 °C), resp. rate 17, height 5' (1.524 m), weight 81.6 kg (179 lb 14.3 oz), SpO2 98 %.       awake and alert   NAD      Intake/Output Summary (Last 24 hours) at 8/25/2020 0914  Last data filed at 8/25/2020 0904  Gross per 24 hour   Intake 4839 ml   Output 3000 ml   Net 1839 ml      Recent Labs     08/25/20  0410   WBC 11.0     Lab Results   Component Value Date/Time    Sodium 141 08/25/2020 04:10 AM    Potassium 3.2 (L) 08/25/2020 04:10 AM    Chloride 109 08/25/2020 04:10 AM    CO2 25 08/25/2020 04:10 AM    Anion gap 7 08/25/2020 04:10 AM    Glucose 112 (H) 08/25/2020 04:10 AM    BUN 14 08/25/2020 04:10 AM    Creatinine 2.21 (H) 08/25/2020 04:10 AM    BUN/Creatinine ratio 6 (L) 08/25/2020 04:10 AM    GFR est AA 29 (L) 08/25/2020 04:10 AM    GFR est non-AA 24 (L) 08/25/2020 04:10 AM    Calcium 9.0 08/25/2020 04:10 AM        Current Facility-Administered Medications   Medication Dose Route Frequency Provider Last Rate Last Dose    insulin lispro (HUMALOG) injection   SubCUTAneous AC&HS Darell Michaels MD   Stopped at 08/24/20 1130    glucose chewable tablet 16 g  4 Tab Oral PRN Darell Michaels MD        glucagon [facility]) injection 1 mg  1 mg IntraMUSCular PRN Darell Michaels MD        senna-docusate (PERICOLACE) 8.6-50 mg per tablet 1 Tab  1 Tab Oral DAILY Rik Muro MD   1 Tab at 08/24/20 1149    polyethylene glycol (MIRALAX) packet 17 g  17 g Oral DAILY Rik Muro MD   17 g at 08/24/20 1149    amoxicillin (AMOXIL) capsule 500 mg  500 mg Oral Q8H Rik Muro MD   500 mg at 08/25/20 0596    sodium chloride (NS) flush 5-10 mL  5-10 mL IntraVENous PRN Precious Ramirez DO        sodium chloride (NS) flush 5-40 mL  5-40 mL IntraVENous Q8H Ashok Madrigal MD   10 mL at 08/25/20 0612    sodium chloride (NS) flush 5-40 mL  5-40 mL IntraVENous PRN Ashok Madrigal MD        ondansetron (ZOFRAN) injection 4 mg  4 mg IntraVENous Q4H PRN Ashok Madrigal MD   4 mg at 08/25/20 8515    acetaminophen (TYLENOL) tablet 650 mg  650 mg Oral Q4H PRN Asohk Madrigal MD        oxyCODONE IR (ROXICODONE) tablet 5 mg  5 mg Oral Q4H PRN Ashok Madrigal MD   5 mg at 08/24/20 1535    morphine injection 2 mg  2 mg IntraVENous Q3H PRN Ashok Madrigal MD        bisacodyL (DULCOLAX) tablet 5 mg  5 mg Oral DAILY PRN Ashok Madrigal MD        zolpidem (AMBIEN) tablet 5 mg  5 mg Oral QHS PRN Ashok Madrigal MD        atorvastatin (LIPITOR) tablet 40 mg  40 mg Oral DAILY Ashok Madrigal MD   40 mg at 08/24/20 4159    cholecalciferol (VITAMIN D3) (1000 Units /25 mcg) tablet 5 Tab  5,000 Units Oral DAILY Ashok Madrigal MD   5 Tab at 08/24/20 0808    pantoprazole (PROTONIX) tablet 40 mg  40 mg Oral BID Ashok Madrigal MD   40 mg at 08/24/20 2056

## 2020-08-25 NOTE — ROUTINE PROCESS
Bedside and Verbal shift change report given to Erin Muro RN (oncoming nurse) by Fozia Madden RN (offgoing nurse). Report included the following information SBAR and Kardex.

## 2020-08-25 NOTE — ROUTINE PROCESS
Bedside and Verbal shift change report given to Ernst Mcghee RN (oncoming nurse) by Venkatesh Cullen RN (offgoing nurse). Report included the following information SBAR and Kardex.

## 2020-08-25 NOTE — PROGRESS NOTES
Discharge instructions reviewed with the patient. Patient verbalized understanding and verified by teach back. All questions answered. IV discontinued by primary staff, no redness, swelling or pain noted. Patient parent at bedside for transportation home. Patient armband removed and shredded.

## 2020-08-25 NOTE — PROGRESS NOTES
2315-Mother at bedside in reclining chair. Patient resting quietly in bed, eyes closed, arouses easily. Denies pain at this time. Call light and personal items within reach. Assessment complete. 0136-Resting quietly in bed with eyes closed, respirations 16-18. Mother at bedside in reclining chair. Stable. 0350-Mother still at bedside in reclining chair. Stable. Patient resting quietly in bed. Mother reports patient complains of ear pain and ask if this writer could check patient ears. Patient states, when asked, that her ears feel foggy. Complains of some nausea. Informed of time nausea medication could be administered. 0410-Sitting up in chair at bedside per mother's request.  Alee Agrawal. 0647-Patient medicated for complaint of nausea. Stable. Shift Summary:  Stable at shift change report. rouses easily. 0650-Medicated per orders.   Patient complains of some ear fogginess

## 2020-08-25 NOTE — PROGRESS NOTES
Discussed with mom again. Pt tolerated lunch very well. She feel comfortable to bring her daughter home today.

## 2020-08-25 NOTE — DISCHARGE SUMMARY
Discharge Summary    Patient: Cristina Aguilar MRN: [id number]  CSN: [id number]    YOB: 1970  Age: 52 y.o. Sex: female    DOA: 8/21/2020 LOS:  LOS: 4 days   Discharge Date:      Primary Care Provider:  Clarisa Schulz MD    Admission Diagnoses: ARIANNA (acute kidney injury) ([facility] [N17.9]  UTI (urinary tract infection) [N39.0]    Discharge Diagnoses:    Hospital Problems  Never Reviewed          Codes Class Noted POA    Diabetes ([facility] ICD-10-CM: E11.9  ICD-9-CM: 250.00  Unknown Unknown        High cholesterol ICD-10-CM: E78.00  ICD-9-CM: 272.0  Unknown Unknown        Intellectual delay ICD-10-CM: F81.9  ICD-9-CM: 315.9  8/24/2020 Yes        Nausea and vomiting ICD-10-CM: R11.2  ICD-9-CM: 787.01  8/24/2020 Unknown        UTI (urinary tract infection) ICD-10-CM: N39.0  ICD-9-CM: 599.0  8/21/2020 Unknown        ARIANNA (acute kidney injury) [facility]) ICD-10-CM: N17.9  ICD-9-CM: 584.9  8/21/2020 Unknown              Discharge Condition: stable     Discharge Medications:     Current Discharge Medication List      START taking these medications    Details   amoxicillin (AMOXIL) 500 mg capsule Take 1 Cap by mouth every twelve (12) hours for 5 days. Qty: 10 Cap, Refills: 0      glipiZIDE (GLUCOTROL) 5 mg tablet Take 0.5 Tabs by mouth two (2) times a day. Hold medication if not eating, or fasting glucose <126  Qty: 15 Tab, Refills: 0      Blood-Glucose Meter monitoring kit As instructed  Qty: 1 Kit, Refills: 0         CONTINUE these medications which have NOT CHANGED    Details   atorvastatin (LIPITOR) 10 mg tablet Take 40 mg by mouth daily. pantoprazole (PROTONIX) 20 mg tablet Take 40 mg by mouth two (2) times a day. famotidine (PEPCID) 10 mg tablet Take 40 mg by mouth two (2) times a day. cholecalciferol (Vitamin D3) (1000 Units /25 mcg) tablet Take 5,000 Units by mouth daily.          STOP taking these medications       metFORMIN (GLUCOPHAGE) 1,000 mg tablet Comments:   Reason for Stopping: Procedures : none     Consults: Gastroenterology      PHYSICAL EXAM   Visit Vitals  /87 (BP 1 Location: Right arm, BP Patient Position: Sitting)   Pulse (!) 107   Temp 97.5 °F (36.4 °C)   Resp 17   Ht 5' (1.524 m)   Wt 81.6 kg (179 lb 14.3 oz)   SpO2 100%   BMI 35.13 kg/m²     General: Awake, cooperative, no acute distress    HEENT: NC, Atraumatic. PERRLA, EOMI. Anicteric sclerae. Lungs:  CTA Bilaterally. No Wheezing/Rhonchi/Rales. Heart:  Regular  rhythm,  No murmur, No Rubs, No Gallops  Abdomen: Soft, Non distended, Non tender. +Bowel sounds,   Extremities: No c/c/e  Psych:   Not anxious or agitated. Neurologic:  No acute neurological deficits. Admission HPI :   Sunni Gil is a 52 y.o. female with history of hypertension, hyperlipidemia, diabetes and persistent nausea vomiting which was thought to be due to dehydration so lab work was obtained yesterday and she was sent to the emergency room for further evaluation. The room she was found to have an slightly elevated white blood count along with a grossly abnormal urinalysis which definitely indicated infection. As a result, the ER physician called a sepsis alert. Lactic acid was normal.  However creatinine was found to be grossly abnormal at 4.62. There is no comparison creatinine available in the chart and patient does not know what her baseline creatinine is. Hospital Course : Sunni Gil is a 52 y.o. female with history of hypertension, hyperlipidemia, diabetes was admitted due to dehydration and jazmine. Since she was admitted, iv hydration was administrated. Nephrologist was on board, her renal function got improving per  Iv hydration. She was cleared to be d/c per nephrologist  We also treated uti and will continue po abx to complete the treatment. She has DM and was on metformin at home. ssi was used to control her DM. Pt did not need insulin during her stay.  On discharge, discussed with pt's mother about holding metformin due to renal function. Also needs to check glucose at home if glucose low or pt not eating no medication needed. Also needs to show glucometer to pcp for medication adjustment. She indicated a verbal understanding. Also recommend  miralax from counter for constipation if needs     Discharge planning discussed with patient, pt agrees  with the plan and no questions and concerns at this point. Activity: Activity as tolerated    Diet: Diabetic Diet    Follow-up: PCP and Dr. Chula Jiménez     Disposition: home     Minutes spent on discharge: 45 min       Labs: Results:       Chemistry Recent Labs     08/25/20 0410 08/24/20  0500 08/23/20  0225   * 101* 108*    143 140   K 3.2* 3.7 3.5    112* 108   CO2 25 24 24   BUN 14 19* 29*   CREA 2.21* 2.38* 3.08*   CA 9.0 8.8 9.1   AGAP 7 7 8   BUCR 6* 8* 9*      CBC w/Diff Recent Labs     08/25/20 0410 08/24/20  0500 08/23/20 0225   WBC 11.0 9.7 11.2   RBC 3.86* 3.74* 3.85*   HGB 10.0* 9.6* 9.7*   HCT 31.6* 30.5* 31.8*    320 353   GRANS 69  --   --    LYMPH 16*  --   --    EOS 8*  --   --       Cardiac Enzymes No results for input(s): CPK, CKND1, STEPHAN in the last 72 hours. No lab exists for component: CKRMB, TROIP   Coagulation No results for input(s): PTP, INR, APTT, INREXT, INREXT in the last 72 hours. Lipid Panel No results found for: CHOL, CHOLPOCT, CHOLX, CHLST, CHOLV, [id number], HDL, HDLP, LDL, LDLC, DLDLP, 031948, VLDLC, VLDL, TGLX, TRIGL, TRIGP, TGLPOCT, CHHD, CHHDX   BNP No results for input(s): BNPP in the last 72 hours. Liver Enzymes No results for input(s): TP, ALB, TBIL, AP in the last 72 hours. No lab exists for component: SGOT, GPT, DBIL   Thyroid Studies No results found for: T4, T3U, TSH, TSHEXT, TSHEXT       @micro    Significant Diagnostic Studies: Xr Spine Lumb 2 Or 3 V    Result Date: 8/24/2020  EXAM: XR SPINE LUMB 2 OR 3 V INDICATION: Low back pain with anemia. Renal failure. COMPARISON: None. FINDINGS: AP, lateral and spot lateral views of the lumbar spine. There is mild dextroconvex curvature of the mid lumbar spine noted. Vertebral body heights are within normal limits. No compression fracture. Intervertebral disc heights appear largely preserved. Mild lower lumbar predominant facet joint osteoarthritis. Bony mineralization appears within normal limits. No discrete osteolytic lesion. Fallopian tube surgical clips project over the upper central pelvis. IMPRESSION: Mild lower lumbar joint osteoarthritis without radiographic evidence of acute abnormality or discrete osteolytic lesion. Us Retroperitoneum Comp    Result Date: 8/21/2020  Exam: Ultrasound retroperitoneum complete INDICATION: Acute renal failure. History of diabetes. COMPARISON: None FINDINGS: Right kidney: 13.4 cm. Parenchymal mantle is normal in thickness. Renal echotexture is slightly greater than that of the adjacent liver. Circumscribed 12 x 11 mm rounded hyperechoic mass is present in the lower pole of the parenchymal mantle. No renal calculi or hydronephrosis. Left kidney: 13.0 cm. Renal echotexture is equivocal and with the right kidney. No hydronephrosis, renal calculi or renal space-occupying lesions. Bladder: No bladder filling defects. Bilateral ureteral jets were demonstrated with color Doppler. Patient voided prior to the exam, bladder volume measured 62 cc. IMPRESSION: 1. Echogenic renal parenchymal mantles bilaterally compatible with medical renal disease. No hydronephrosis. 2. 4 mm echogenic lower pole right renal mass suggesting benign angiomyolipoma.             [facility]     CC: Clarisa Schulz MD

## 2020-08-25 NOTE — PROGRESS NOTES
Pt is intellectually disabled. Pt lives with her parents and is independent. Pt/family have confirmed pt's PCP. Provider has indicated pt will likely transition home with in the next 24-48 hours with family support and physician follow up. No other transition of care needs have been identified. CM remains available. Care Management Interventions  Mode of Transport at Discharge:  Other (see comment)(Family)  Transition of Care Consult (CM Consult): Discharge Planning  Health Maintenance Reviewed: Yes  Current Support Network: Family Lives Nearby  Confirm Follow Up Transport: Self  The Plan for Transition of Care is Related to the Following Treatment Goals : Home with physician follow up   Discharge Location  Discharge Placement: Home with family assistance

## 2020-08-25 NOTE — DISCHARGE INSTRUCTIONS
Patient Education        Urinary Tract Infection in Women: Care Instructions  Your Care Instructions     A urinary tract infection, or UTI, is a general term for an infection anywhere between the kidneys and the urethra (where urine comes out). Most UTIs are bladder infections. They often cause pain or burning when you urinate. UTIs are caused by bacteria and can be cured with antibiotics. Be sure to complete your treatment so that the infection goes away. Follow-up care is a key part of your treatment and safety. Be sure to make and go to all appointments, and call your doctor if you are having problems. It's also a good idea to know your test results and keep a list of the medicines you take. How can you care for yourself at home? · Take your antibiotics as directed. Do not stop taking them just because you feel better. You need to take the full course of antibiotics. · Drink extra water and other fluids for the next day or two. This may help wash out the bacteria that are causing the infection. (If you have kidney, heart, or liver disease and have to limit fluids, talk with your doctor before you increase your fluid intake.)  · Avoid drinks that are carbonated or have caffeine. They can irritate the bladder. · Urinate often. Try to empty your bladder each time. · To relieve pain, take a hot bath or lay a heating pad set on low over your lower belly or genital area. Never go to sleep with a heating pad in place. To prevent UTIs  · Drink plenty of water each day. This helps you urinate often, which clears bacteria from your system. (If you have kidney, heart, or liver disease and have to limit fluids, talk with your doctor before you increase your fluid intake.)  · Urinate when you need to. · Urinate right after you have sex. · Change sanitary pads often. · Avoid douches, bubble baths, feminine hygiene sprays, and other feminine hygiene products that have deodorants.   · After going to the bathroom, wipe from front to back. When should you call for help? Call your doctor now or seek immediate medical care if:  · Symptoms such as fever, chills, nausea, or vomiting get worse or appear for the first time. · You have new pain in your back just below your rib cage. This is called flank pain. · There is new blood or pus in your urine. · You have any problems with your antibiotic medicine. Watch closely for changes in your health, and be sure to contact your doctor if:  · You are not getting better after taking an antibiotic for 2 days. · Your symptoms go away but then come back. Where can you learn more? Go to http://isaura-last.info/  Enter I984 in the search box to learn more about \"Urinary Tract Infection in Women: Care Instructions. \"  Current as of: August 22, 2019               Content Version: 12.5  © 7461-7563 [employer]. Care instructions adapted under license by [employer] (which disclaims liability or warranty for this information). If you have questions about a medical condition or this instruction, always ask your healthcare professional. [place] [address] any warranty or liability for your use of this information. Acute Kidney Injury: Care Instructions  Your Care Instructions     Acute kidney injury (ARAINNA) is a sudden decrease in kidney function. This can happen over a period of hours, days or, in some cases, weeks. ARIANNA used to be called acute renal failure, but kidney failure doesn't always happen with ARIANNA. Common causes of ARIANNA are dehydration, blood loss, and medicines. When ARIANNA happens, the kidneys have trouble removing waste and excess fluids from the body. The waste and fluids build up and become harmful. Kidney function may return to normal if the cause of ARIANNA is treated quickly.  Your chance of a full recovery depends on what caused the problem, how quickly the cause was treated, and what other medical problems you have. A machine may be used to help your kidneys remove waste and fluids for a short period of time. This is called dialysis. Follow-up care is a key part of your treatment and safety. Be sure to make and go to all appointments, and call your doctor if you are having problems. It's also a good idea to know your test results and keep a list of the medicines you take. How can you care for yourself at home? · Talk to your doctor about how much fluid you should drink. · Eat a balanced diet. Talk to your doctor or a dietitian about what type of diet may be best for you. You may need to limit sodium, potassium, and phosphorus. · If you need dialysis, follow the instructions and schedule for dialysis that your doctor gives you. · Do not smoke. Smoking can make your condition worse. If you need help quitting, talk to your doctor about stop-smoking programs and medicines. These can increase your chances of quitting for good. · Do not drink alcohol. · Review all of your medicines with your doctor. Do not take any medicines, including nonsteroidal anti-inflammatory drugs (NSAIDs) such as ibuprofen (Advil, Motrin) or naproxen (Aleve), unless your doctor says it is safe for you to do so. · Make sure that anyone treating you for any health problem knows that you have had ARIANNA. When should you call for help? [id number] anytime you think you may need emergency care. For example, call if:  · You passed out (lost consciousness). Call your doctor now or seek immediate medical care if:  · You have new or worse nausea and vomiting. · You have much less urine than normal, or you have no urine. · You are feeling confused or cannot think clearly. · You have new or more blood in your urine. · You have new swelling. · You are dizzy or lightheaded, or feel like you may faint. Watch closely for changes in your health, and be sure to contact your doctor if:  · You do not get better as expected. Where can you learn more?   Go to http://www.gray.com/  Enter T634 in the search box to learn more about \"Acute Kidney Injury: Care Instructions. \"  Current as of: August 12, 2019               Content Version: 12.5  © 8403-8079 [employer]. Care instructions adapted under license by [employer] (which disclaims liability or warranty for this information). If you have questions about a medical condition or this instruction, always ask your healthcare professional. [address] any warranty or liability for your use of this information.

## 2020-08-26 ENCOUNTER — PATIENT OUTREACH (OUTPATIENT)
Dept: CASE MANAGEMENT | Age: 50
End: 2020-08-26

## 2020-08-26 LAB
ALBUMIN SERPL ELPH-MCNC: 2.7 G/DL (ref 2.9–4.4)
ALBUMIN SERPL ELPH-MCNC: 3.1 G/DL (ref 2.9–4.4)
ALBUMIN/GLOB SERPL: 0.7 {RATIO} (ref 0.7–1.7)
ALBUMIN/GLOB SERPL: 0.7 {RATIO} (ref 0.7–1.7)
ALPHA1 GLOB SERPL ELPH-MCNC: 0.3 G/DL (ref 0–0.4)
ALPHA1 GLOB SERPL ELPH-MCNC: 0.3 G/DL (ref 0–0.4)
ALPHA2 GLOB SERPL ELPH-MCNC: 1.2 G/DL (ref 0.4–1)
ALPHA2 GLOB SERPL ELPH-MCNC: 1.4 G/DL (ref 0.4–1)
ANA TITR SER IF: NEGATIVE {TITER}
B-GLOBULIN SERPL ELPH-MCNC: 0.9 G/DL (ref 0.7–1.3)
B-GLOBULIN SERPL ELPH-MCNC: 1.1 G/DL (ref 0.7–1.3)
GAMMA GLOB SERPL ELPH-MCNC: 1.4 G/DL (ref 0.4–1.8)
GAMMA GLOB SERPL ELPH-MCNC: 1.6 G/DL (ref 0.4–1.8)
GLOBULIN SER CALC-MCNC: 3.7 G/DL (ref 2.2–3.9)
GLOBULIN SER-MCNC: 4.5 G/DL (ref 2.2–3.9)
IGA SERPL-MCNC: 291 MG/DL (ref 87–352)
IGG SERPL-MCNC: 1518 MG/DL (ref 586–1602)
IGM SERPL-MCNC: 185 MG/DL (ref 26–217)
INTERPRETATION SERPL IEP-IMP: ABNORMAL
KAPPA LC FREE SER-MCNC: 65.7 MG/L (ref 3.3–19.4)
KAPPA LC FREE/LAMBDA FREE SER: 2.07 {RATIO} (ref 0.26–1.65)
LAMBDA LC FREE SERPL-MCNC: 31.7 MG/L (ref 5.7–26.3)
M PROTEIN SERPL ELPH-MCNC: ABNORMAL G/DL
M PROTEIN SERPL ELPH-MCNC: ABNORMAL G/DL
PLEASE NOTE, 734348: NORMAL
PROT SERPL-MCNC: 6.4 G/DL (ref 6–8.5)
PROT SERPL-MCNC: 7.6 G/DL (ref 6–8.5)

## 2020-08-26 NOTE — PROGRESS NOTES
Patient contacted regarding recent discharge and COVID-19 risk. Discussed COVID-19 related testing which was not done at this time. Test results were not done. Patient informed of results, if available? N/A      Contacted parent for follow up. Male answered phone and voiced Paris Ulloa had the wrong number. No other contact numbers on file. Episode of care resolved.

## 2020-08-27 LAB
BACTERIA SPEC CULT: NORMAL
BACTERIA SPEC CULT: NORMAL
SERVICE CMNT-IMP: NORMAL
SERVICE CMNT-IMP: NORMAL

## 2020-08-31 NOTE — PROGRESS NOTES
Physician Progress Note      Kylie ULRICH #:                  [id number]  :                       1970  ADMIT DATE:       2020 8:28 AM  DISCH DATE:        2020 4:44 PM  RESPONDING  PROVIDER #:        Akanksha Moss MD          QUERY TEXT:    Pt admitted with UTI . Pt noted to have elevated WBC and ARIANNA on admission. If possible, please document in the progress notes and discharge summary if you are evaluating and /or treating any of the following: The medical record reflects the following:  Risk Factors: 53 yo female admitted with UTI    Clinical Indicators: WBC 13.4     on admission  => Bun Creatinine 46 Creatinine 3.96  GFR  12      Treatment: Nephrology consult, Levaquin IV in house, Amoxicillin x 5 days on discharge        Thank your time,  Sindi Zazueta RN, [facility]  [phone]  Options provided:  -- Sepsis, present on admission  -- Sepsis, now resolved  -- Sepsis, not present on admission,  -- No Sepsis, UTI only  -- Sepsis was ruled out  -- Other - I will add my own diagnosis  -- Disagree - Not applicable / Not valid  -- Disagree - Clinically unable to determine / Unknown  -- Refer to Clinical Documentation Reviewer    PROVIDER RESPONSE TEXT:    This patient has Sepsis which was present on admission.     Query created by: Alyson Valdivia on 2020 9:00 AM      Electronically signed by:  Akanksha SALMON MD 2020 2:35 PM

## 2021-07-21 ENCOUNTER — TRANSCRIBE ORDER (OUTPATIENT)
Dept: REGISTRATION | Age: 51
End: 2021-07-21

## 2021-07-21 ENCOUNTER — HOSPITAL ENCOUNTER (OUTPATIENT)
Dept: LAB | Age: 51
Discharge: HOME | End: 2021-07-21
Payer: MEDICARE

## 2021-07-21 DIAGNOSIS — E11.69 DIABETES MELLITUS ASSOCIATED WITH HORMONAL ETIOLOGY (HCC): ICD-10-CM

## 2021-07-21 DIAGNOSIS — E11.29 TYPE II OR UNSPECIFIED TYPE DIABETES MELLITUS WITH RENAL MANIFESTATIONS, UNCONTROLLED(250.42) (HCC): Primary | ICD-10-CM

## 2021-07-21 DIAGNOSIS — E55.9 AVITAMINOSIS D: ICD-10-CM

## 2021-07-21 DIAGNOSIS — E11.65 TYPE II OR UNSPECIFIED TYPE DIABETES MELLITUS WITH RENAL MANIFESTATIONS, UNCONTROLLED(250.42) (HCC): Primary | ICD-10-CM

## 2021-07-21 DIAGNOSIS — E11.29 TYPE II OR UNSPECIFIED TYPE DIABETES MELLITUS WITH RENAL MANIFESTATIONS, UNCONTROLLED(250.42) (HCC): ICD-10-CM

## 2021-07-21 DIAGNOSIS — E11.65 TYPE II OR UNSPECIFIED TYPE DIABETES MELLITUS WITH RENAL MANIFESTATIONS, UNCONTROLLED(250.42) (HCC): ICD-10-CM

## 2021-07-21 LAB
25(OH)D3 SERPL-MCNC: 52.5 NG/ML (ref 30–100)
ALBUMIN SERPL-MCNC: 3.9 G/DL (ref 3.4–5)
ALBUMIN/GLOB SERPL: 1 {RATIO} (ref 0.8–1.7)
ALP SERPL-CCNC: 106 U/L (ref 45–117)
ALT SERPL-CCNC: 22 U/L (ref 13–56)
ANION GAP SERPL CALC-SCNC: 6 MMOL/L (ref 3–18)
AST SERPL-CCNC: 11 U/L (ref 10–38)
BASOPHILS # BLD: 0 K/UL (ref 0–0.1)
BASOPHILS NFR BLD: 0 % (ref 0–2)
BILIRUB SERPL-MCNC: 0.3 MG/DL (ref 0.2–1)
BUN SERPL-MCNC: 16 MG/DL (ref 7–18)
BUN/CREAT SERPL: 27 (ref 12–20)
CALCIUM SERPL-MCNC: 9.5 MG/DL (ref 8.5–10.1)
CHLORIDE SERPL-SCNC: 103 MMOL/L (ref 100–111)
CHOLEST SERPL-MCNC: 168 MG/DL
CO2 SERPL-SCNC: 30 MMOL/L (ref 21–32)
CREAT SERPL-MCNC: 0.59 MG/DL (ref 0.6–1.3)
CREAT UR-MCNC: 90 MG/DL (ref 30–125)
DIFFERENTIAL METHOD BLD: ABNORMAL
EOSINOPHIL # BLD: 0.2 K/UL (ref 0–0.4)
EOSINOPHIL NFR BLD: 2 % (ref 0–5)
ERYTHROCYTE [DISTWIDTH] IN BLOOD BY AUTOMATED COUNT: 15.2 % (ref 11.6–14.5)
EST. AVERAGE GLUCOSE BLD GHB EST-MCNC: 123 MG/DL
GLOBULIN SER CALC-MCNC: 3.9 G/DL (ref 2–4)
GLUCOSE SERPL-MCNC: 100 MG/DL (ref 74–99)
HBA1C MFR BLD: 5.9 % (ref 4.2–5.6)
HCT VFR BLD AUTO: 40.9 % (ref 35–45)
HDLC SERPL-MCNC: 69 MG/DL (ref 40–60)
HDLC SERPL: 2.4 {RATIO} (ref 0–5)
HGB BLD-MCNC: 12.7 G/DL (ref 12–16)
LDLC SERPL CALC-MCNC: 84 MG/DL (ref 0–100)
LIPID PROFILE,FLP: ABNORMAL
LYMPHOCYTES # BLD: 2.6 K/UL (ref 0.9–3.6)
LYMPHOCYTES NFR BLD: 33 % (ref 21–52)
MCH RBC QN AUTO: 28 PG (ref 24–34)
MCHC RBC AUTO-ENTMCNC: 31.1 G/DL (ref 31–37)
MCV RBC AUTO: 90.1 FL (ref 74–97)
MICROALBUMIN UR-MCNC: 0.75 MG/DL (ref 0–3)
MICROALBUMIN/CREAT UR-RTO: 8 MG/G (ref 0–30)
MONOCYTES # BLD: 0.4 K/UL (ref 0.05–1.2)
MONOCYTES NFR BLD: 5 % (ref 3–10)
NEUTS SEG # BLD: 4.6 K/UL (ref 1.8–8)
NEUTS SEG NFR BLD: 59 % (ref 40–73)
PLATELET # BLD AUTO: 293 K/UL (ref 135–420)
PMV BLD AUTO: 11.1 FL (ref 9.2–11.8)
POTASSIUM SERPL-SCNC: 4 MMOL/L (ref 3.5–5.5)
PROT SERPL-MCNC: 7.8 G/DL (ref 6.4–8.2)
RBC # BLD AUTO: 4.54 M/UL (ref 4.2–5.3)
SODIUM SERPL-SCNC: 139 MMOL/L (ref 136–145)
TRIGL SERPL-MCNC: 75 MG/DL (ref ?–150)
VLDLC SERPL CALC-MCNC: 15 MG/DL
WBC # BLD AUTO: 7.7 K/UL (ref 4.6–13.2)

## 2021-07-21 PROCEDURE — 80061 LIPID PANEL: CPT

## 2021-07-21 PROCEDURE — 80053 COMPREHEN METABOLIC PANEL: CPT

## 2021-07-21 PROCEDURE — 82043 UR ALBUMIN QUANTITATIVE: CPT

## 2021-07-21 PROCEDURE — 83036 HEMOGLOBIN GLYCOSYLATED A1C: CPT

## 2021-07-21 PROCEDURE — 85025 COMPLETE CBC W/AUTO DIFF WBC: CPT

## 2021-07-21 PROCEDURE — 82306 VITAMIN D 25 HYDROXY: CPT

## 2021-07-21 PROCEDURE — 36415 COLL VENOUS BLD VENIPUNCTURE: CPT

## 2021-07-22 LAB
FAX TO INFO,FAXT: NORMAL
FAX TO NUMBER,FAXN: NORMAL

## 2021-11-22 ENCOUNTER — HOSPITAL ENCOUNTER (OUTPATIENT)
Dept: LAB | Age: 51
Discharge: HOME | End: 2021-11-22
Payer: MEDICARE

## 2021-11-22 ENCOUNTER — TRANSCRIBE ORDER (OUTPATIENT)
Dept: REGISTRATION | Age: 51
End: 2021-11-22

## 2021-11-22 DIAGNOSIS — E11.65 TYPE II OR UNSPECIFIED TYPE DIABETES MELLITUS WITH RENAL MANIFESTATIONS, UNCONTROLLED(250.42) (HCC): Primary | ICD-10-CM

## 2021-11-22 DIAGNOSIS — E11.65 TYPE II OR UNSPECIFIED TYPE DIABETES MELLITUS WITH RENAL MANIFESTATIONS, UNCONTROLLED(250.42) (HCC): ICD-10-CM

## 2021-11-22 DIAGNOSIS — E11.29 TYPE II OR UNSPECIFIED TYPE DIABETES MELLITUS WITH RENAL MANIFESTATIONS, UNCONTROLLED(250.42) (HCC): Primary | ICD-10-CM

## 2021-11-22 DIAGNOSIS — E11.29 TYPE II OR UNSPECIFIED TYPE DIABETES MELLITUS WITH RENAL MANIFESTATIONS, UNCONTROLLED(250.42) (HCC): ICD-10-CM

## 2021-11-22 LAB
ANION GAP SERPL CALC-SCNC: 6 MMOL/L (ref 3–18)
BUN SERPL-MCNC: 13 MG/DL (ref 7–18)
BUN/CREAT SERPL: 22 (ref 12–20)
CALCIUM SERPL-MCNC: 9.2 MG/DL (ref 8.5–10.1)
CHLORIDE SERPL-SCNC: 105 MMOL/L (ref 100–111)
CO2 SERPL-SCNC: 29 MMOL/L (ref 21–32)
CREAT SERPL-MCNC: 0.58 MG/DL (ref 0.6–1.3)
ERYTHROCYTE [DISTWIDTH] IN BLOOD BY AUTOMATED COUNT: 13.9 % (ref 11.6–14.5)
GLUCOSE SERPL-MCNC: 97 MG/DL (ref 74–99)
HBA1C MFR BLD: 5.8 % (ref 4.2–5.6)
HCT VFR BLD AUTO: 38.9 % (ref 35–45)
HGB BLD-MCNC: 12.1 G/DL (ref 12–16)
MCH RBC QN AUTO: 27.8 PG (ref 24–34)
MCHC RBC AUTO-ENTMCNC: 31.1 G/DL (ref 31–37)
MCV RBC AUTO: 89.4 FL (ref 78–100)
NRBC # BLD: 0 K/UL (ref 0–0.01)
NRBC BLD-RTO: 0 PER 100 WBC
PLATELET # BLD AUTO: 248 K/UL (ref 135–420)
PMV BLD AUTO: 10.6 FL (ref 9.2–11.8)
POTASSIUM SERPL-SCNC: 3.6 MMOL/L (ref 3.5–5.5)
RBC # BLD AUTO: 4.35 M/UL (ref 4.2–5.3)
SODIUM SERPL-SCNC: 140 MMOL/L (ref 136–145)
T4 FREE SERPL-MCNC: 1 NG/DL (ref 0.7–1.5)
TSH SERPL DL<=0.05 MIU/L-ACNC: 2.29 UIU/ML (ref 0.36–3.74)
URATE SERPL-MCNC: 4.1 MG/DL (ref 2.6–7.2)
WBC # BLD AUTO: 6.2 K/UL (ref 4.6–13.2)

## 2021-11-22 PROCEDURE — 84439 ASSAY OF FREE THYROXINE: CPT

## 2021-11-22 PROCEDURE — 84443 ASSAY THYROID STIM HORMONE: CPT

## 2021-11-22 PROCEDURE — 85027 COMPLETE CBC AUTOMATED: CPT

## 2021-11-22 PROCEDURE — 36415 COLL VENOUS BLD VENIPUNCTURE: CPT

## 2021-11-22 PROCEDURE — 80048 BASIC METABOLIC PNL TOTAL CA: CPT

## 2021-11-22 PROCEDURE — 84550 ASSAY OF BLOOD/URIC ACID: CPT

## 2021-11-22 PROCEDURE — 83036 HEMOGLOBIN GLYCOSYLATED A1C: CPT

## 2021-11-23 LAB
FAX TO INFO,FAXT: NORMAL
FAX TO NUMBER,FAXN: NORMAL

## 2022-03-18 PROBLEM — F81.9 INTELLECTUAL DELAY: Status: ACTIVE | Noted: 2020-08-24

## 2022-03-19 PROBLEM — R11.2 NAUSEA AND VOMITING: Status: ACTIVE | Noted: 2020-08-24

## 2022-03-19 PROBLEM — N39.0 UTI (URINARY TRACT INFECTION): Status: ACTIVE | Noted: 2020-08-21

## 2022-03-19 PROBLEM — N92.0 MENORRHAGIA: Status: ACTIVE | Noted: 2020-08-24

## 2022-03-19 PROBLEM — N17.9 AKI (ACUTE KIDNEY INJURY) (HCC): Status: ACTIVE | Noted: 2020-08-21

## 2022-03-21 ENCOUNTER — HOSPITAL ENCOUNTER (OUTPATIENT)
Dept: LAB | Age: 52
Discharge: HOME | End: 2022-03-21
Payer: MEDICARE

## 2022-03-21 LAB
25(OH)D3 SERPL-MCNC: 57.9 NG/ML (ref 30–100)
ALBUMIN SERPL-MCNC: 3.7 G/DL (ref 3.4–5)
ALBUMIN/GLOB SERPL: 1 {RATIO} (ref 0.8–1.7)
ALP SERPL-CCNC: 113 U/L (ref 45–117)
ALT SERPL-CCNC: 19 U/L (ref 13–56)
ANION GAP SERPL CALC-SCNC: 3 MMOL/L (ref 3–18)
AST SERPL-CCNC: 15 U/L (ref 10–38)
BILIRUB SERPL-MCNC: 0.4 MG/DL (ref 0.2–1)
BUN SERPL-MCNC: 11 MG/DL (ref 7–18)
BUN/CREAT SERPL: 22 (ref 12–20)
CALCIUM SERPL-MCNC: 9 MG/DL (ref 8.5–10.1)
CHLORIDE SERPL-SCNC: 103 MMOL/L (ref 100–111)
CHOLEST SERPL-MCNC: 135 MG/DL
CO2 SERPL-SCNC: 31 MMOL/L (ref 21–32)
CREAT SERPL-MCNC: 0.51 MG/DL (ref 0.6–1.3)
CREAT UR-MCNC: <13 MG/DL (ref 30–125)
ERYTHROCYTE [DISTWIDTH] IN BLOOD BY AUTOMATED COUNT: 13.5 % (ref 11.6–14.5)
EST. AVERAGE GLUCOSE BLD GHB EST-MCNC: 126 MG/DL
GLOBULIN SER CALC-MCNC: 3.6 G/DL (ref 2–4)
GLUCOSE SERPL-MCNC: 91 MG/DL (ref 74–99)
HBA1C MFR BLD: 6 % (ref 4.2–5.6)
HCT VFR BLD AUTO: 38.6 % (ref 35–45)
HDLC SERPL-MCNC: 64 MG/DL (ref 40–60)
HDLC SERPL: 2.1 {RATIO} (ref 0–5)
HGB BLD-MCNC: 12.2 G/DL (ref 12–16)
LDLC SERPL CALC-MCNC: 60.2 MG/DL (ref 0–100)
LIPID PROFILE,FLP: ABNORMAL
MCH RBC QN AUTO: 28.4 PG (ref 24–34)
MCHC RBC AUTO-ENTMCNC: 31.6 G/DL (ref 31–37)
MCV RBC AUTO: 90 FL (ref 78–100)
MICROALBUMIN UR-MCNC: <0.5 MG/DL (ref 0–3)
MICROALBUMIN/CREAT UR-RTO: ABNORMAL MG/G (ref 0–30)
NRBC # BLD: 0 K/UL (ref 0–0.01)
NRBC BLD-RTO: 0 PER 100 WBC
PLATELET # BLD AUTO: 243 K/UL (ref 135–420)
PMV BLD AUTO: 11.1 FL (ref 9.2–11.8)
POTASSIUM SERPL-SCNC: 3.6 MMOL/L (ref 3.5–5.5)
PROT SERPL-MCNC: 7.3 G/DL (ref 6.4–8.2)
RBC # BLD AUTO: 4.29 M/UL (ref 4.2–5.3)
SODIUM SERPL-SCNC: 137 MMOL/L (ref 136–145)
TRIGL SERPL-MCNC: 54 MG/DL (ref ?–150)
VLDLC SERPL CALC-MCNC: 10.8 MG/DL
WBC # BLD AUTO: 7.2 K/UL (ref 4.6–13.2)

## 2022-03-21 PROCEDURE — 82043 UR ALBUMIN QUANTITATIVE: CPT

## 2022-03-21 PROCEDURE — 85027 COMPLETE CBC AUTOMATED: CPT

## 2022-03-21 PROCEDURE — 80061 LIPID PANEL: CPT

## 2022-03-21 PROCEDURE — 36415 COLL VENOUS BLD VENIPUNCTURE: CPT

## 2022-03-21 PROCEDURE — 80053 COMPREHEN METABOLIC PANEL: CPT

## 2022-03-21 PROCEDURE — 82306 VITAMIN D 25 HYDROXY: CPT

## 2022-03-21 PROCEDURE — 83036 HEMOGLOBIN GLYCOSYLATED A1C: CPT

## 2022-03-22 LAB
FAX TO INFO,FAXT: NORMAL
FAX TO NUMBER,FAXN: NORMAL

## 2022-07-20 ENCOUNTER — TRANSCRIBE ORDER (OUTPATIENT)
Dept: REGISTRATION | Age: 52
End: 2022-07-20

## 2022-07-20 ENCOUNTER — HOSPITAL ENCOUNTER (OUTPATIENT)
Dept: LAB | Age: 52
Discharge: HOME | End: 2022-07-20
Payer: MEDICARE

## 2022-07-20 DIAGNOSIS — D50.9 IRON DEFICIENCY ANEMIA, UNSPECIFIED: ICD-10-CM

## 2022-07-20 DIAGNOSIS — E11.00 TYPE II DIABETES MELLITUS WITH HYPEROSMOLARITY, UNCONTROLLED (HCC): Primary | ICD-10-CM

## 2022-07-20 DIAGNOSIS — E11.65 TYPE II DIABETES MELLITUS WITH HYPEROSMOLARITY, UNCONTROLLED (HCC): Primary | ICD-10-CM

## 2022-07-20 DIAGNOSIS — E11.65 TYPE II DIABETES MELLITUS WITH HYPEROSMOLARITY, UNCONTROLLED (HCC): ICD-10-CM

## 2022-07-20 DIAGNOSIS — E11.00 TYPE II DIABETES MELLITUS WITH HYPEROSMOLARITY, UNCONTROLLED (HCC): ICD-10-CM

## 2022-07-20 LAB
ERYTHROCYTE [DISTWIDTH] IN BLOOD BY AUTOMATED COUNT: 13.2 % (ref 11.6–14.5)
HBA1C MFR BLD: 6.2 % (ref 4.2–5.6)
HCT VFR BLD AUTO: 41 % (ref 35–45)
HGB BLD-MCNC: 13 G/DL (ref 12–16)
MCH RBC QN AUTO: 28.6 PG (ref 24–34)
MCHC RBC AUTO-ENTMCNC: 31.7 G/DL (ref 31–37)
MCV RBC AUTO: 90.1 FL (ref 78–100)
NRBC # BLD: 0 K/UL (ref 0–0.01)
NRBC BLD-RTO: 0 PER 100 WBC
PLATELET # BLD AUTO: 278 K/UL (ref 135–420)
PMV BLD AUTO: 11.3 FL (ref 9.2–11.8)
RBC # BLD AUTO: 4.55 M/UL (ref 4.2–5.3)
WBC # BLD AUTO: 8.4 K/UL (ref 4.6–13.2)

## 2022-07-20 PROCEDURE — 83036 HEMOGLOBIN GLYCOSYLATED A1C: CPT

## 2022-07-20 PROCEDURE — 36415 COLL VENOUS BLD VENIPUNCTURE: CPT

## 2022-07-20 PROCEDURE — 85027 COMPLETE CBC AUTOMATED: CPT

## 2022-11-21 ENCOUNTER — HOSPITAL ENCOUNTER (OUTPATIENT)
Dept: LAB | Age: 52
Discharge: HOME | End: 2022-11-21
Payer: MEDICARE

## 2022-11-21 ENCOUNTER — TRANSCRIBE ORDER (OUTPATIENT)
Dept: REGISTRATION | Age: 52
End: 2022-11-21

## 2022-11-21 DIAGNOSIS — E11.65 TYPE II OR UNSPECIFIED TYPE DIABETES MELLITUS WITH RENAL MANIFESTATIONS, UNCONTROLLED(250.42) (HCC): Primary | ICD-10-CM

## 2022-11-21 DIAGNOSIS — E11.69 DIABETES MELLITUS ASSOCIATED WITH HORMONAL ETIOLOGY (HCC): ICD-10-CM

## 2022-11-21 DIAGNOSIS — E11.29 TYPE II OR UNSPECIFIED TYPE DIABETES MELLITUS WITH RENAL MANIFESTATIONS, UNCONTROLLED(250.42) (HCC): Primary | ICD-10-CM

## 2022-11-21 LAB
ANION GAP SERPL CALC-SCNC: 5 MMOL/L (ref 3–18)
BUN SERPL-MCNC: 15 MG/DL (ref 7–18)
BUN/CREAT SERPL: 25 (ref 12–20)
CALCIUM SERPL-MCNC: 9.7 MG/DL (ref 8.5–10.1)
CHLORIDE SERPL-SCNC: 100 MMOL/L (ref 100–111)
CHOLEST SERPL-MCNC: 179 MG/DL
CO2 SERPL-SCNC: 31 MMOL/L (ref 21–32)
CREAT SERPL-MCNC: 0.61 MG/DL (ref 0.6–1.3)
CREAT UR-MCNC: 22 MG/DL (ref 30–125)
ERYTHROCYTE [DISTWIDTH] IN BLOOD BY AUTOMATED COUNT: 13.6 % (ref 11.6–14.5)
EST. AVERAGE GLUCOSE BLD GHB EST-MCNC: 123 MG/DL
GLUCOSE SERPL-MCNC: 98 MG/DL (ref 74–99)
HBA1C MFR BLD: 5.9 % (ref 4.2–5.6)
HCT VFR BLD AUTO: 39 % (ref 35–45)
HDLC SERPL-MCNC: 64 MG/DL (ref 40–60)
HDLC SERPL: 2.8 {RATIO} (ref 0–5)
HGB BLD-MCNC: 12 G/DL (ref 12–16)
LDLC SERPL CALC-MCNC: 92.4 MG/DL (ref 0–100)
LIPID PROFILE,FLP: ABNORMAL
MCH RBC QN AUTO: 28.3 PG (ref 24–34)
MCHC RBC AUTO-ENTMCNC: 30.8 G/DL (ref 31–37)
MCV RBC AUTO: 92 FL (ref 78–100)
MICROALBUMIN UR-MCNC: <0.5 MG/DL (ref 0–3)
MICROALBUMIN/CREAT UR-RTO: ABNORMAL MG/G (ref 0–30)
NRBC # BLD: 0 K/UL (ref 0–0.01)
NRBC BLD-RTO: 0 PER 100 WBC
PLATELET # BLD AUTO: 281 K/UL (ref 135–420)
PMV BLD AUTO: 11.1 FL (ref 9.2–11.8)
POTASSIUM SERPL-SCNC: 4 MMOL/L (ref 3.5–5.5)
RBC # BLD AUTO: 4.24 M/UL (ref 4.2–5.3)
SODIUM SERPL-SCNC: 136 MMOL/L (ref 136–145)
TRIGL SERPL-MCNC: 113 MG/DL (ref ?–150)
VLDLC SERPL CALC-MCNC: 22.6 MG/DL
WBC # BLD AUTO: 8 K/UL (ref 4.6–13.2)

## 2022-11-21 PROCEDURE — 80048 BASIC METABOLIC PNL TOTAL CA: CPT

## 2022-11-21 PROCEDURE — 80061 LIPID PANEL: CPT

## 2022-11-21 PROCEDURE — 82043 UR ALBUMIN QUANTITATIVE: CPT

## 2022-11-21 PROCEDURE — 83036 HEMOGLOBIN GLYCOSYLATED A1C: CPT

## 2022-11-21 PROCEDURE — 36415 COLL VENOUS BLD VENIPUNCTURE: CPT

## 2022-11-21 PROCEDURE — 85027 COMPLETE CBC AUTOMATED: CPT

## 2023-03-27 ENCOUNTER — HOSPITAL ENCOUNTER (OUTPATIENT)
Facility: HOSPITAL | Age: 53
Discharge: HOME | End: 2023-03-30
Payer: MEDICARE

## 2023-03-27 LAB
25(OH)D3 SERPL-MCNC: 46 NG/ML (ref 30–100)
ANION GAP SERPL CALC-SCNC: 2 MMOL/L (ref 3–18)
BUN SERPL-MCNC: 14 MG/DL (ref 7–18)
BUN/CREAT SERPL: 22 (ref 12–20)
CALCIUM SERPL-MCNC: 9.4 MG/DL (ref 8.5–10.1)
CHLORIDE SERPL-SCNC: 104 MMOL/L (ref 100–111)
CO2 SERPL-SCNC: 31 MMOL/L (ref 21–32)
CREAT SERPL-MCNC: 0.63 MG/DL (ref 0.6–1.3)
EST. AVERAGE GLUCOSE BLD GHB EST-MCNC: 123 MG/DL
GLUCOSE SERPL-MCNC: 105 MG/DL (ref 74–99)
HBA1C MFR BLD: 5.9 % (ref 4.2–5.6)
POTASSIUM SERPL-SCNC: 3.9 MMOL/L (ref 3.5–5.5)
SODIUM SERPL-SCNC: 137 MMOL/L (ref 136–145)

## 2023-03-27 PROCEDURE — 83036 HEMOGLOBIN GLYCOSYLATED A1C: CPT

## 2023-03-27 PROCEDURE — 80048 BASIC METABOLIC PNL TOTAL CA: CPT

## 2023-03-27 PROCEDURE — 82306 VITAMIN D 25 HYDROXY: CPT

## 2023-03-27 PROCEDURE — 36415 COLL VENOUS BLD VENIPUNCTURE: CPT

## 2023-03-28 LAB
FAX TO NUMBER: NORMAL
TEST RESULTS FAXED TO: NORMAL

## 2023-11-20 ENCOUNTER — HOSPITAL ENCOUNTER (OUTPATIENT)
Facility: HOSPITAL | Age: 53
Discharge: HOME | End: 2023-11-23
Payer: MEDICARE

## 2023-11-20 ENCOUNTER — TRANSCRIBE ORDERS (OUTPATIENT)
Facility: HOSPITAL | Age: 53
End: 2023-11-20

## 2023-11-20 DIAGNOSIS — E11.00 TYPE II DIABETES MELLITUS WITH HYPEROSMOLARITY, UNCONTROLLED (HCC): ICD-10-CM

## 2023-11-20 DIAGNOSIS — E11.65 TYPE II DIABETES MELLITUS WITH HYPEROSMOLARITY, UNCONTROLLED (HCC): Primary | ICD-10-CM

## 2023-11-20 DIAGNOSIS — E78.5 HYPERLIPIDEMIA, UNSPECIFIED HYPERLIPIDEMIA TYPE: ICD-10-CM

## 2023-11-20 DIAGNOSIS — E55.9 AVITAMINOSIS D: ICD-10-CM

## 2023-11-20 DIAGNOSIS — E11.65 TYPE II DIABETES MELLITUS WITH HYPEROSMOLARITY, UNCONTROLLED (HCC): ICD-10-CM

## 2023-11-20 DIAGNOSIS — E11.00 TYPE II DIABETES MELLITUS WITH HYPEROSMOLARITY, UNCONTROLLED (HCC): Primary | ICD-10-CM

## 2023-11-20 LAB
25(OH)D3 SERPL-MCNC: 64.5 NG/ML (ref 30–100)
ALBUMIN SERPL-MCNC: 3.8 G/DL (ref 3.4–5)
ALBUMIN/GLOB SERPL: 0.9 (ref 0.8–1.7)
ALP SERPL-CCNC: 166 U/L (ref 45–117)
ALT SERPL-CCNC: 21 U/L (ref 13–56)
ANION GAP SERPL CALC-SCNC: 4 MMOL/L (ref 3–18)
AST SERPL-CCNC: 14 U/L (ref 10–38)
BILIRUB SERPL-MCNC: 0.4 MG/DL (ref 0.2–1)
BUN SERPL-MCNC: 12 MG/DL (ref 7–18)
BUN/CREAT SERPL: 19 (ref 12–20)
CALCIUM SERPL-MCNC: 9.3 MG/DL (ref 8.5–10.1)
CHLORIDE SERPL-SCNC: 102 MMOL/L (ref 100–111)
CHOLEST SERPL-MCNC: 161 MG/DL
CO2 SERPL-SCNC: 31 MMOL/L (ref 21–32)
CREAT SERPL-MCNC: 0.63 MG/DL (ref 0.6–1.3)
CREAT UR-MCNC: <13 MG/DL (ref 30–125)
ERYTHROCYTE [DISTWIDTH] IN BLOOD BY AUTOMATED COUNT: 15.5 % (ref 11.6–14.5)
EST. AVERAGE GLUCOSE BLD GHB EST-MCNC: 126 MG/DL
GLOBULIN SER CALC-MCNC: 4.2 G/DL (ref 2–4)
GLUCOSE SERPL-MCNC: 107 MG/DL (ref 74–99)
HBA1C MFR BLD: 6 % (ref 4.2–5.6)
HCT VFR BLD AUTO: 38.3 % (ref 35–45)
HDLC SERPL-MCNC: 57 MG/DL (ref 40–60)
HDLC SERPL: 2.8 (ref 0–5)
HGB BLD-MCNC: 11.5 G/DL (ref 12–16)
LDLC SERPL CALC-MCNC: 84.4 MG/DL (ref 0–100)
LIPID PANEL: NORMAL
MCH RBC QN AUTO: 25.8 PG (ref 24–34)
MCHC RBC AUTO-ENTMCNC: 30 G/DL (ref 31–37)
MCV RBC AUTO: 86.1 FL (ref 78–100)
MICROALBUMIN UR-MCNC: <0.5 MG/DL (ref 0–3)
MICROALBUMIN/CREAT UR-RTO: ABNORMAL MG/G (ref 0–30)
NRBC # BLD: 0 K/UL (ref 0–0.01)
NRBC BLD-RTO: 0 PER 100 WBC
PLATELET # BLD AUTO: 297 K/UL (ref 135–420)
PMV BLD AUTO: 10.8 FL (ref 9.2–11.8)
POTASSIUM SERPL-SCNC: 3.9 MMOL/L (ref 3.5–5.5)
PROT SERPL-MCNC: 8 G/DL (ref 6.4–8.2)
RBC # BLD AUTO: 4.45 M/UL (ref 4.2–5.3)
SODIUM SERPL-SCNC: 137 MMOL/L (ref 136–145)
TRIGL SERPL-MCNC: 98 MG/DL
TSH SERPL DL<=0.05 MIU/L-ACNC: 3.67 UIU/ML (ref 0.36–3.74)
VLDLC SERPL CALC-MCNC: 19.6 MG/DL
WBC # BLD AUTO: 10.3 K/UL (ref 4.6–13.2)

## 2023-11-20 PROCEDURE — 36415 COLL VENOUS BLD VENIPUNCTURE: CPT

## 2023-11-20 PROCEDURE — 80061 LIPID PANEL: CPT

## 2023-11-20 PROCEDURE — 82306 VITAMIN D 25 HYDROXY: CPT

## 2023-11-20 PROCEDURE — 83036 HEMOGLOBIN GLYCOSYLATED A1C: CPT

## 2023-11-20 PROCEDURE — 82043 UR ALBUMIN QUANTITATIVE: CPT

## 2023-11-20 PROCEDURE — 82570 ASSAY OF URINE CREATININE: CPT

## 2023-11-20 PROCEDURE — 80053 COMPREHEN METABOLIC PANEL: CPT

## 2023-11-20 PROCEDURE — 85027 COMPLETE CBC AUTOMATED: CPT

## 2023-11-20 PROCEDURE — 84443 ASSAY THYROID STIM HORMONE: CPT

## 2024-03-20 ENCOUNTER — HOSPITAL ENCOUNTER (OUTPATIENT)
Facility: HOSPITAL | Age: 54
Discharge: HOME | End: 2024-03-23
Payer: MEDICARE

## 2024-03-20 ENCOUNTER — TRANSCRIBE ORDERS (OUTPATIENT)
Facility: HOSPITAL | Age: 54
End: 2024-03-20

## 2024-03-20 DIAGNOSIS — E11.65 TYPE II DIABETES MELLITUS WITH HYPEROSMOLARITY, UNCONTROLLED (HCC): Primary | ICD-10-CM

## 2024-03-20 DIAGNOSIS — E11.65 TYPE II DIABETES MELLITUS WITH HYPEROSMOLARITY, UNCONTROLLED (HCC): ICD-10-CM

## 2024-03-20 DIAGNOSIS — E11.00 TYPE II DIABETES MELLITUS WITH HYPEROSMOLARITY, UNCONTROLLED (HCC): Primary | ICD-10-CM

## 2024-03-20 DIAGNOSIS — E11.00 TYPE II DIABETES MELLITUS WITH HYPEROSMOLARITY, UNCONTROLLED (HCC): ICD-10-CM

## 2024-03-20 LAB
ANION GAP SERPL CALC-SCNC: 4 MMOL/L (ref 3–18)
BASOPHILS # BLD: 0 K/UL (ref 0–0.1)
BASOPHILS NFR BLD: 0 % (ref 0–2)
BUN SERPL-MCNC: 17 MG/DL (ref 7–18)
BUN/CREAT SERPL: 27 (ref 12–20)
CALCIUM SERPL-MCNC: 9.6 MG/DL (ref 8.5–10.1)
CHLORIDE SERPL-SCNC: 104 MMOL/L (ref 100–111)
CO2 SERPL-SCNC: 31 MMOL/L (ref 21–32)
CREAT SERPL-MCNC: 0.63 MG/DL (ref 0.6–1.3)
DIFFERENTIAL METHOD BLD: ABNORMAL
EOSINOPHIL # BLD: 0.3 K/UL (ref 0–0.4)
EOSINOPHIL NFR BLD: 2 % (ref 0–5)
ERYTHROCYTE [DISTWIDTH] IN BLOOD BY AUTOMATED COUNT: 15.5 % (ref 11.6–14.5)
EST. AVERAGE GLUCOSE BLD GHB EST-MCNC: 131 MG/DL
GLUCOSE SERPL-MCNC: 97 MG/DL (ref 74–99)
HBA1C MFR BLD: 6.2 % (ref 4.2–5.6)
HCT VFR BLD AUTO: 38.1 % (ref 35–45)
HGB BLD-MCNC: 11.8 G/DL (ref 12–16)
IMM GRANULOCYTES # BLD AUTO: 0.1 K/UL (ref 0–0.04)
IMM GRANULOCYTES NFR BLD AUTO: 1 % (ref 0–0.5)
LYMPHOCYTES # BLD: 2.4 K/UL (ref 0.9–3.6)
LYMPHOCYTES NFR BLD: 21 % (ref 21–52)
MCH RBC QN AUTO: 25.8 PG (ref 24–34)
MCHC RBC AUTO-ENTMCNC: 31 G/DL (ref 31–37)
MCV RBC AUTO: 83.2 FL (ref 78–100)
MONOCYTES # BLD: 0.5 K/UL (ref 0.05–1.2)
MONOCYTES NFR BLD: 4 % (ref 3–10)
NEUTS SEG # BLD: 8.1 K/UL (ref 1.8–8)
NEUTS SEG NFR BLD: 72 % (ref 40–73)
NRBC # BLD: 0 K/UL (ref 0–0.01)
NRBC BLD-RTO: 0 PER 100 WBC
PLATELET # BLD AUTO: 319 K/UL (ref 135–420)
PMV BLD AUTO: 11.3 FL (ref 9.2–11.8)
POTASSIUM SERPL-SCNC: 4.3 MMOL/L (ref 3.5–5.5)
RBC # BLD AUTO: 4.58 M/UL (ref 4.2–5.3)
SODIUM SERPL-SCNC: 139 MMOL/L (ref 136–145)
WBC # BLD AUTO: 11.3 K/UL (ref 4.6–13.2)

## 2024-03-20 PROCEDURE — 36415 COLL VENOUS BLD VENIPUNCTURE: CPT

## 2024-03-20 PROCEDURE — 80048 BASIC METABOLIC PNL TOTAL CA: CPT

## 2024-03-20 PROCEDURE — 83036 HEMOGLOBIN GLYCOSYLATED A1C: CPT

## 2024-03-20 PROCEDURE — 85025 COMPLETE CBC W/AUTO DIFF WBC: CPT

## 2024-03-21 LAB
FAX TO NUMBER: NORMAL
TEST RESULTS FAXED TO: NORMAL

## 2024-07-17 ENCOUNTER — TRANSCRIBE ORDERS (OUTPATIENT)
Facility: HOSPITAL | Age: 54
End: 2024-07-17

## 2024-07-17 ENCOUNTER — HOSPITAL ENCOUNTER (OUTPATIENT)
Facility: HOSPITAL | Age: 54
Discharge: HOME | End: 2024-07-20
Payer: MEDICARE

## 2024-07-17 DIAGNOSIS — E11.65 INADEQUATELY CONTROLLED DIABETES MELLITUS (HCC): Primary | ICD-10-CM

## 2024-07-17 DIAGNOSIS — E11.69 DIABETES MELLITUS ASSOCIATED WITH HORMONAL ETIOLOGY (HCC): ICD-10-CM

## 2024-07-17 DIAGNOSIS — E11.65 INADEQUATELY CONTROLLED DIABETES MELLITUS (HCC): ICD-10-CM

## 2024-07-17 LAB
ANION GAP SERPL CALC-SCNC: 5 MMOL/L (ref 3–18)
BASOPHILS # BLD: 0 K/UL (ref 0–0.1)
BASOPHILS NFR BLD: 0 % (ref 0–2)
BUN SERPL-MCNC: 13 MG/DL (ref 7–18)
BUN/CREAT SERPL: 22 (ref 12–20)
CALCIUM SERPL-MCNC: 9.1 MG/DL (ref 8.5–10.1)
CHLORIDE SERPL-SCNC: 102 MMOL/L (ref 100–111)
CO2 SERPL-SCNC: 30 MMOL/L (ref 21–32)
CREAT SERPL-MCNC: 0.58 MG/DL (ref 0.6–1.3)
DIFFERENTIAL METHOD BLD: ABNORMAL
EOSINOPHIL # BLD: 0.3 K/UL (ref 0–0.4)
EOSINOPHIL NFR BLD: 2 % (ref 0–5)
ERYTHROCYTE [DISTWIDTH] IN BLOOD BY AUTOMATED COUNT: 15.7 % (ref 11.6–14.5)
EST. AVERAGE GLUCOSE BLD GHB EST-MCNC: 134 MG/DL
GLUCOSE SERPL-MCNC: 108 MG/DL (ref 74–99)
HBA1C MFR BLD: 6.3 % (ref 4.2–5.6)
HCT VFR BLD AUTO: 36.7 % (ref 35–45)
HGB BLD-MCNC: 11.2 G/DL (ref 12–16)
IMM GRANULOCYTES # BLD AUTO: 0 K/UL (ref 0–0.04)
IMM GRANULOCYTES NFR BLD AUTO: 0 % (ref 0–0.5)
LYMPHOCYTES # BLD: 2.4 K/UL (ref 0.9–3.6)
LYMPHOCYTES NFR BLD: 22 % (ref 21–52)
MCH RBC QN AUTO: 25.9 PG (ref 24–34)
MCHC RBC AUTO-ENTMCNC: 30.5 G/DL (ref 31–37)
MCV RBC AUTO: 84.8 FL (ref 78–100)
MONOCYTES # BLD: 0.6 K/UL (ref 0.05–1.2)
MONOCYTES NFR BLD: 5 % (ref 3–10)
NEUTS SEG # BLD: 7.5 K/UL (ref 1.8–8)
NEUTS SEG NFR BLD: 70 % (ref 40–73)
NRBC # BLD: 0 K/UL (ref 0–0.01)
NRBC BLD-RTO: 0 PER 100 WBC
PLATELET # BLD AUTO: 291 K/UL (ref 135–420)
PMV BLD AUTO: 11.2 FL (ref 9.2–11.8)
POTASSIUM SERPL-SCNC: 3.9 MMOL/L (ref 3.5–5.5)
RBC # BLD AUTO: 4.33 M/UL (ref 4.2–5.3)
SODIUM SERPL-SCNC: 137 MMOL/L (ref 136–145)
WBC # BLD AUTO: 10.7 K/UL (ref 4.6–13.2)

## 2024-07-17 PROCEDURE — 36415 COLL VENOUS BLD VENIPUNCTURE: CPT

## 2024-07-17 PROCEDURE — 80048 BASIC METABOLIC PNL TOTAL CA: CPT

## 2024-07-17 PROCEDURE — 85025 COMPLETE CBC W/AUTO DIFF WBC: CPT

## 2024-07-17 PROCEDURE — 83036 HEMOGLOBIN GLYCOSYLATED A1C: CPT

## 2024-07-18 LAB
FAX TO NUMBER: NORMAL
TEST RESULTS FAXED TO: NORMAL

## 2024-11-14 ENCOUNTER — HOSPITAL ENCOUNTER (OUTPATIENT)
Facility: HOSPITAL | Age: 54
Discharge: HOME | End: 2024-11-17
Payer: MEDICARE

## 2024-11-14 ENCOUNTER — TRANSCRIBE ORDERS (OUTPATIENT)
Facility: HOSPITAL | Age: 54
End: 2024-11-14

## 2024-11-14 DIAGNOSIS — E78.5 HYPERLIPIDEMIA, UNSPECIFIED HYPERLIPIDEMIA TYPE: ICD-10-CM

## 2024-11-14 DIAGNOSIS — E11.65 INADEQUATELY CONTROLLED DIABETES MELLITUS (HCC): Primary | ICD-10-CM

## 2024-11-14 DIAGNOSIS — E11.69 DIABETES MELLITUS ASSOCIATED WITH HORMONAL ETIOLOGY (HCC): ICD-10-CM

## 2024-11-14 DIAGNOSIS — E11.65 INADEQUATELY CONTROLLED DIABETES MELLITUS (HCC): ICD-10-CM

## 2024-11-14 LAB
ALBUMIN SERPL-MCNC: 3.9 G/DL (ref 3.4–5)
ALBUMIN/GLOB SERPL: 1 (ref 0.8–1.7)
ALP SERPL-CCNC: 167 U/L (ref 45–117)
ALT SERPL-CCNC: 24 U/L (ref 13–56)
ANION GAP SERPL CALC-SCNC: 7 MMOL/L (ref 3–18)
AST SERPL-CCNC: 19 U/L (ref 10–38)
BILIRUB SERPL-MCNC: 0.3 MG/DL (ref 0.2–1)
BUN SERPL-MCNC: 19 MG/DL (ref 7–18)
BUN/CREAT SERPL: 28 (ref 12–20)
CALCIUM SERPL-MCNC: 9.5 MG/DL (ref 8.5–10.1)
CHLORIDE SERPL-SCNC: 101 MMOL/L (ref 100–111)
CHOLEST SERPL-MCNC: 197 MG/DL
CO2 SERPL-SCNC: 30 MMOL/L (ref 21–32)
CREAT SERPL-MCNC: 0.68 MG/DL (ref 0.6–1.3)
CREAT UR-MCNC: 21 MG/DL (ref 30–125)
ERYTHROCYTE [DISTWIDTH] IN BLOOD BY AUTOMATED COUNT: 15.5 % (ref 11.6–14.5)
EST. AVERAGE GLUCOSE BLD GHB EST-MCNC: 140 MG/DL
GLOBULIN SER CALC-MCNC: 4.1 G/DL (ref 2–4)
GLUCOSE SERPL-MCNC: 116 MG/DL (ref 74–99)
HBA1C MFR BLD: 6.5 % (ref 4.2–5.6)
HCT VFR BLD AUTO: 40 % (ref 35–45)
HDLC SERPL-MCNC: 55 MG/DL (ref 40–60)
HDLC SERPL: 3.6 (ref 0–5)
HGB BLD-MCNC: 12.2 G/DL (ref 12–16)
LDLC SERPL CALC-MCNC: 106.2 MG/DL (ref 0–100)
LIPID PANEL: ABNORMAL
MCH RBC QN AUTO: 26.1 PG (ref 24–34)
MCHC RBC AUTO-ENTMCNC: 30.5 G/DL (ref 31–37)
MCV RBC AUTO: 85.5 FL (ref 78–100)
MICROALBUMIN UR-MCNC: 0.5 MG/DL (ref 0–3)
MICROALBUMIN/CREAT UR-RTO: 24 MG/G (ref 0–30)
NRBC # BLD: 0 K/UL (ref 0–0.01)
NRBC BLD-RTO: 0 PER 100 WBC
PLATELET # BLD AUTO: 314 K/UL (ref 135–420)
PMV BLD AUTO: 10.9 FL (ref 9.2–11.8)
POTASSIUM SERPL-SCNC: 3.8 MMOL/L (ref 3.5–5.5)
PROT SERPL-MCNC: 8 G/DL (ref 6.4–8.2)
RBC # BLD AUTO: 4.68 M/UL (ref 4.2–5.3)
SODIUM SERPL-SCNC: 138 MMOL/L (ref 136–145)
TRIGL SERPL-MCNC: 179 MG/DL
VLDLC SERPL CALC-MCNC: 35.8 MG/DL
WBC # BLD AUTO: 9.7 K/UL (ref 4.6–13.2)

## 2024-11-14 PROCEDURE — 36415 COLL VENOUS BLD VENIPUNCTURE: CPT

## 2024-11-14 PROCEDURE — 82043 UR ALBUMIN QUANTITATIVE: CPT

## 2024-11-14 PROCEDURE — 85027 COMPLETE CBC AUTOMATED: CPT

## 2024-11-14 PROCEDURE — 80053 COMPREHEN METABOLIC PANEL: CPT

## 2024-11-14 PROCEDURE — 82570 ASSAY OF URINE CREATININE: CPT

## 2024-11-14 PROCEDURE — 83036 HEMOGLOBIN GLYCOSYLATED A1C: CPT

## 2024-11-14 PROCEDURE — 80061 LIPID PANEL: CPT

## 2024-11-15 LAB
FAX TO NUMBER: NORMAL
TEST RESULTS FAXED TO: NORMAL

## 2025-03-13 ENCOUNTER — HOSPITAL ENCOUNTER (OUTPATIENT)
Facility: HOSPITAL | Age: 55
Setting detail: SPECIMEN
Discharge: HOME | End: 2025-03-16

## 2025-03-13 LAB — LABCORP SPECIMEN COLLECTION: NORMAL

## 2025-03-13 PROCEDURE — 99001 SPECIMEN HANDLING PT-LAB: CPT

## 2025-07-10 ENCOUNTER — HOSPITAL ENCOUNTER (OUTPATIENT)
Facility: HOSPITAL | Age: 55
Setting detail: SPECIMEN
Discharge: HOME | End: 2025-07-13

## 2025-07-10 LAB — LABCORP SPECIMEN COLLECTION: NORMAL

## 2025-07-10 PROCEDURE — 99001 SPECIMEN HANDLING PT-LAB: CPT
